# Patient Record
Sex: FEMALE | Race: WHITE | Employment: OTHER | URBAN - METROPOLITAN AREA
[De-identification: names, ages, dates, MRNs, and addresses within clinical notes are randomized per-mention and may not be internally consistent; named-entity substitution may affect disease eponyms.]

---

## 2017-04-10 ENCOUNTER — TELEPHONE (OUTPATIENT)
Dept: FAMILY MEDICINE CLINIC | Age: 82
End: 2017-04-10

## 2017-04-10 ENCOUNTER — OFFICE VISIT (OUTPATIENT)
Dept: FAMILY MEDICINE CLINIC | Age: 82
End: 2017-04-10

## 2017-04-10 VITALS
WEIGHT: 154 LBS | DIASTOLIC BLOOD PRESSURE: 54 MMHG | BODY MASS INDEX: 25.63 KG/M2 | OXYGEN SATURATION: 98 % | TEMPERATURE: 98.4 F | SYSTOLIC BLOOD PRESSURE: 126 MMHG | HEART RATE: 81 BPM

## 2017-04-10 DIAGNOSIS — J06.9 UPPER RESPIRATORY TRACT INFECTION, UNSPECIFIED TYPE: Primary | ICD-10-CM

## 2017-04-10 DIAGNOSIS — E11.9 CONTROLLED TYPE 2 DIABETES MELLITUS WITHOUT COMPLICATION, WITHOUT LONG-TERM CURRENT USE OF INSULIN (HCC): ICD-10-CM

## 2017-04-10 DIAGNOSIS — E78.2 MIXED HYPERLIPIDEMIA: Primary | ICD-10-CM

## 2017-04-10 PROCEDURE — 1090F PRES/ABSN URINE INCON ASSESS: CPT | Performed by: FAMILY MEDICINE

## 2017-04-10 PROCEDURE — 1036F TOBACCO NON-USER: CPT | Performed by: FAMILY MEDICINE

## 2017-04-10 PROCEDURE — 4040F PNEUMOC VAC/ADMIN/RCVD: CPT | Performed by: FAMILY MEDICINE

## 2017-04-10 PROCEDURE — G8400 PT W/DXA NO RESULTS DOC: HCPCS | Performed by: FAMILY MEDICINE

## 2017-04-10 PROCEDURE — 99213 OFFICE O/P EST LOW 20 MIN: CPT | Performed by: FAMILY MEDICINE

## 2017-04-10 PROCEDURE — G8420 CALC BMI NORM PARAMETERS: HCPCS | Performed by: FAMILY MEDICINE

## 2017-04-10 PROCEDURE — G8427 DOCREV CUR MEDS BY ELIG CLIN: HCPCS | Performed by: FAMILY MEDICINE

## 2017-04-10 PROCEDURE — 36415 COLL VENOUS BLD VENIPUNCTURE: CPT | Performed by: FAMILY MEDICINE

## 2017-04-10 PROCEDURE — 1123F ACP DISCUSS/DSCN MKR DOCD: CPT | Performed by: FAMILY MEDICINE

## 2017-04-10 RX ORDER — AMOXICILLIN 875 MG/1
875 TABLET, COATED ORAL 2 TIMES DAILY
Qty: 20 TABLET | Refills: 0 | Status: SHIPPED | OUTPATIENT
Start: 2017-04-10 | End: 2017-04-20

## 2017-04-10 RX ORDER — DEXTROMETHORPHAN HYDROBROMIDE AND PROMETHAZINE HYDROCHLORIDE 15; 6.25 MG/5ML; MG/5ML
5 SYRUP ORAL 4 TIMES DAILY PRN
Qty: 120 ML | Refills: 0 | Status: SHIPPED | OUTPATIENT
Start: 2017-04-10 | End: 2017-04-17

## 2017-04-10 ASSESSMENT — ENCOUNTER SYMPTOMS
SORE THROAT: 0
COUGH: 1

## 2017-04-25 ENCOUNTER — NURSE ONLY (OUTPATIENT)
Dept: FAMILY MEDICINE CLINIC | Age: 82
End: 2017-04-25

## 2017-04-25 DIAGNOSIS — E78.2 MIXED HYPERLIPIDEMIA: Primary | ICD-10-CM

## 2017-04-25 LAB
ALT SERPL-CCNC: 20 U/L (ref 10–40)
CHOLESTEROL, TOTAL: 187 MG/DL (ref 0–199)
HDLC SERPL-MCNC: 61 MG/DL (ref 40–60)
LDL CHOLESTEROL CALCULATED: 99 MG/DL
TRIGL SERPL-MCNC: 133 MG/DL (ref 0–150)
VLDLC SERPL CALC-MCNC: 27 MG/DL

## 2017-04-26 LAB
ESTIMATED AVERAGE GLUCOSE: 185.8 MG/DL
HBA1C MFR BLD: 8.1 %

## 2017-05-22 ENCOUNTER — OFFICE VISIT (OUTPATIENT)
Dept: FAMILY MEDICINE CLINIC | Age: 82
End: 2017-05-22

## 2017-05-22 VITALS
OXYGEN SATURATION: 99 % | SYSTOLIC BLOOD PRESSURE: 124 MMHG | RESPIRATION RATE: 16 BRPM | DIASTOLIC BLOOD PRESSURE: 80 MMHG | HEART RATE: 70 BPM | BODY MASS INDEX: 24.96 KG/M2 | WEIGHT: 150 LBS

## 2017-05-22 DIAGNOSIS — I10 BENIGN ESSENTIAL HTN: ICD-10-CM

## 2017-05-22 DIAGNOSIS — F51.01 PRIMARY INSOMNIA: ICD-10-CM

## 2017-05-22 DIAGNOSIS — E78.2 MIXED HYPERLIPIDEMIA: ICD-10-CM

## 2017-05-22 PROCEDURE — G8427 DOCREV CUR MEDS BY ELIG CLIN: HCPCS | Performed by: FAMILY MEDICINE

## 2017-05-22 PROCEDURE — G8400 PT W/DXA NO RESULTS DOC: HCPCS | Performed by: FAMILY MEDICINE

## 2017-05-22 PROCEDURE — 99214 OFFICE O/P EST MOD 30 MIN: CPT | Performed by: FAMILY MEDICINE

## 2017-05-22 PROCEDURE — 4040F PNEUMOC VAC/ADMIN/RCVD: CPT | Performed by: FAMILY MEDICINE

## 2017-05-22 PROCEDURE — G8420 CALC BMI NORM PARAMETERS: HCPCS | Performed by: FAMILY MEDICINE

## 2017-05-22 PROCEDURE — 1123F ACP DISCUSS/DSCN MKR DOCD: CPT | Performed by: FAMILY MEDICINE

## 2017-05-22 PROCEDURE — 1090F PRES/ABSN URINE INCON ASSESS: CPT | Performed by: FAMILY MEDICINE

## 2017-05-22 PROCEDURE — 1036F TOBACCO NON-USER: CPT | Performed by: FAMILY MEDICINE

## 2017-05-22 RX ORDER — SIMVASTATIN 20 MG
20 TABLET ORAL NIGHTLY
Qty: 90 TABLET | Refills: 1 | Status: SHIPPED | OUTPATIENT
Start: 2017-05-22 | End: 2017-11-11 | Stop reason: SDUPTHER

## 2017-05-22 RX ORDER — ZOLPIDEM TARTRATE 5 MG/1
5 TABLET ORAL NIGHTLY PRN
COMMUNITY
End: 2017-05-22 | Stop reason: SDUPTHER

## 2017-05-22 RX ORDER — LISINOPRIL 2.5 MG/1
2.5 TABLET ORAL DAILY
Qty: 90 TABLET | Refills: 1 | Status: SHIPPED | OUTPATIENT
Start: 2017-05-22 | End: 2017-12-30 | Stop reason: SDUPTHER

## 2017-05-22 RX ORDER — PIOGLITAZONEHYDROCHLORIDE 15 MG/1
15 TABLET ORAL DAILY
Qty: 90 TABLET | Refills: 1 | Status: CANCELLED | OUTPATIENT
Start: 2017-05-22

## 2017-05-22 RX ORDER — ZOLPIDEM TARTRATE 5 MG/1
5 TABLET ORAL NIGHTLY PRN
Qty: 30 TABLET | Refills: 0 | Status: SHIPPED | OUTPATIENT
Start: 2017-05-22 | End: 2019-07-22 | Stop reason: SDUPTHER

## 2017-05-23 ENCOUNTER — TELEPHONE (OUTPATIENT)
Dept: FAMILY MEDICINE CLINIC | Age: 82
End: 2017-05-23

## 2017-05-31 ENCOUNTER — TELEPHONE (OUTPATIENT)
Dept: FAMILY MEDICINE CLINIC | Age: 82
End: 2017-05-31

## 2017-06-14 ENCOUNTER — TELEPHONE (OUTPATIENT)
Dept: FAMILY MEDICINE CLINIC | Age: 82
End: 2017-06-14

## 2017-09-01 ENCOUNTER — TELEPHONE (OUTPATIENT)
Dept: FAMILY MEDICINE CLINIC | Age: 82
End: 2017-09-01

## 2017-09-01 PROCEDURE — 36415 COLL VENOUS BLD VENIPUNCTURE: CPT | Performed by: FAMILY MEDICINE

## 2017-09-05 ENCOUNTER — NURSE ONLY (OUTPATIENT)
Dept: FAMILY MEDICINE CLINIC | Age: 82
End: 2017-09-05

## 2017-09-05 LAB
A/G RATIO: 1.6 (ref 1.1–2.2)
ALBUMIN SERPL-MCNC: 3.9 G/DL (ref 3.4–5)
ALP BLD-CCNC: 55 U/L (ref 40–129)
ALT SERPL-CCNC: 18 U/L (ref 10–40)
ANION GAP SERPL CALCULATED.3IONS-SCNC: 14 MMOL/L (ref 3–16)
AST SERPL-CCNC: 18 U/L (ref 15–37)
BILIRUB SERPL-MCNC: 0.4 MG/DL (ref 0–1)
BUN BLDV-MCNC: 15 MG/DL (ref 7–20)
CALCIUM SERPL-MCNC: 9.1 MG/DL (ref 8.3–10.6)
CHLORIDE BLD-SCNC: 99 MMOL/L (ref 99–110)
CO2: 27 MMOL/L (ref 21–32)
CREAT SERPL-MCNC: 0.8 MG/DL (ref 0.6–1.2)
GFR AFRICAN AMERICAN: >60
GFR NON-AFRICAN AMERICAN: >60
GLOBULIN: 2.5 G/DL
GLUCOSE BLD-MCNC: 140 MG/DL (ref 70–99)
POTASSIUM SERPL-SCNC: 4.9 MMOL/L (ref 3.5–5.1)
SODIUM BLD-SCNC: 140 MMOL/L (ref 136–145)
TOTAL PROTEIN: 6.4 G/DL (ref 6.4–8.2)

## 2017-09-05 PROCEDURE — 36415 COLL VENOUS BLD VENIPUNCTURE: CPT | Performed by: FAMILY MEDICINE

## 2017-09-06 ENCOUNTER — OFFICE VISIT (OUTPATIENT)
Dept: FAMILY MEDICINE CLINIC | Age: 82
End: 2017-09-06

## 2017-09-06 VITALS
HEART RATE: 70 BPM | DIASTOLIC BLOOD PRESSURE: 52 MMHG | OXYGEN SATURATION: 98 % | BODY MASS INDEX: 24.96 KG/M2 | SYSTOLIC BLOOD PRESSURE: 122 MMHG | WEIGHT: 150 LBS

## 2017-09-06 DIAGNOSIS — E11.9 CONTROLLED TYPE 2 DIABETES MELLITUS WITHOUT COMPLICATION, WITHOUT LONG-TERM CURRENT USE OF INSULIN (HCC): Primary | ICD-10-CM

## 2017-09-06 DIAGNOSIS — Z23 NEEDS FLU SHOT: ICD-10-CM

## 2017-09-06 DIAGNOSIS — F41.9 ANXIETY: ICD-10-CM

## 2017-09-06 LAB
ESTIMATED AVERAGE GLUCOSE: 162.8 MG/DL
HBA1C MFR BLD: 7.3 %

## 2017-09-06 PROCEDURE — G8427 DOCREV CUR MEDS BY ELIG CLIN: HCPCS | Performed by: FAMILY MEDICINE

## 2017-09-06 PROCEDURE — G8400 PT W/DXA NO RESULTS DOC: HCPCS | Performed by: FAMILY MEDICINE

## 2017-09-06 PROCEDURE — 1123F ACP DISCUSS/DSCN MKR DOCD: CPT | Performed by: FAMILY MEDICINE

## 2017-09-06 PROCEDURE — 1090F PRES/ABSN URINE INCON ASSESS: CPT | Performed by: FAMILY MEDICINE

## 2017-09-06 PROCEDURE — G0008 ADMIN INFLUENZA VIRUS VAC: HCPCS | Performed by: FAMILY MEDICINE

## 2017-09-06 PROCEDURE — 1036F TOBACCO NON-USER: CPT | Performed by: FAMILY MEDICINE

## 2017-09-06 PROCEDURE — 90662 IIV NO PRSV INCREASED AG IM: CPT | Performed by: FAMILY MEDICINE

## 2017-09-06 PROCEDURE — 4040F PNEUMOC VAC/ADMIN/RCVD: CPT | Performed by: FAMILY MEDICINE

## 2017-09-06 PROCEDURE — G8420 CALC BMI NORM PARAMETERS: HCPCS | Performed by: FAMILY MEDICINE

## 2017-09-06 PROCEDURE — 99213 OFFICE O/P EST LOW 20 MIN: CPT | Performed by: FAMILY MEDICINE

## 2017-09-06 RX ORDER — ALPRAZOLAM 0.25 MG/1
0.25 TABLET ORAL DAILY PRN
Qty: 30 TABLET | Refills: 0 | Status: SHIPPED | OUTPATIENT
Start: 2017-09-06 | End: 2019-07-22 | Stop reason: SDUPTHER

## 2017-09-07 ENCOUNTER — TELEPHONE (OUTPATIENT)
Dept: FAMILY MEDICINE CLINIC | Age: 82
End: 2017-09-07

## 2017-09-07 DIAGNOSIS — E11.9 CONTROLLED TYPE 2 DIABETES MELLITUS WITHOUT COMPLICATION, WITHOUT LONG-TERM CURRENT USE OF INSULIN (HCC): Primary | ICD-10-CM

## 2017-09-10 PROBLEM — E11.9 CONTROLLED TYPE 2 DIABETES MELLITUS WITHOUT COMPLICATION, WITHOUT LONG-TERM CURRENT USE OF INSULIN (HCC): Status: ACTIVE | Noted: 2017-09-10

## 2017-09-22 ENCOUNTER — OFFICE VISIT (OUTPATIENT)
Dept: FAMILY MEDICINE CLINIC | Age: 82
End: 2017-09-22

## 2017-09-22 VITALS
HEART RATE: 65 BPM | SYSTOLIC BLOOD PRESSURE: 126 MMHG | OXYGEN SATURATION: 97 % | WEIGHT: 149 LBS | BODY MASS INDEX: 24.79 KG/M2 | DIASTOLIC BLOOD PRESSURE: 72 MMHG

## 2017-09-22 DIAGNOSIS — R31.9 BLOOD IN URINE: ICD-10-CM

## 2017-09-22 DIAGNOSIS — R30.9 PAINFUL URINATION: Primary | ICD-10-CM

## 2017-09-22 LAB
BILIRUBIN, POC: NORMAL
BLOOD URINE, POC: NORMAL
CLARITY, POC: NORMAL
COLOR, POC: NORMAL
GLUCOSE URINE, POC: NORMAL
KETONES, POC: NORMAL
LEUKOCYTE EST, POC: NORMAL
NITRITE, POC: NORMAL
PH, POC: 7
PROTEIN, POC: 30
SPECIFIC GRAVITY, POC: 1.01
UROBILINOGEN, POC: 0.2

## 2017-09-22 PROCEDURE — 1123F ACP DISCUSS/DSCN MKR DOCD: CPT | Performed by: FAMILY MEDICINE

## 2017-09-22 PROCEDURE — G8420 CALC BMI NORM PARAMETERS: HCPCS | Performed by: FAMILY MEDICINE

## 2017-09-22 PROCEDURE — 4040F PNEUMOC VAC/ADMIN/RCVD: CPT | Performed by: FAMILY MEDICINE

## 2017-09-22 PROCEDURE — G8427 DOCREV CUR MEDS BY ELIG CLIN: HCPCS | Performed by: FAMILY MEDICINE

## 2017-09-22 PROCEDURE — 81002 URINALYSIS NONAUTO W/O SCOPE: CPT | Performed by: FAMILY MEDICINE

## 2017-09-22 PROCEDURE — G8400 PT W/DXA NO RESULTS DOC: HCPCS | Performed by: FAMILY MEDICINE

## 2017-09-22 PROCEDURE — 99214 OFFICE O/P EST MOD 30 MIN: CPT | Performed by: FAMILY MEDICINE

## 2017-09-22 PROCEDURE — 1036F TOBACCO NON-USER: CPT | Performed by: FAMILY MEDICINE

## 2017-09-22 PROCEDURE — 1090F PRES/ABSN URINE INCON ASSESS: CPT | Performed by: FAMILY MEDICINE

## 2017-09-22 RX ORDER — SULFAMETHOXAZOLE AND TRIMETHOPRIM 800; 160 MG/1; MG/1
1 TABLET ORAL 2 TIMES DAILY
Qty: 20 TABLET | Refills: 0 | Status: SHIPPED | OUTPATIENT
Start: 2017-09-22 | End: 2017-10-02

## 2017-09-24 LAB — URINE CULTURE, ROUTINE: NORMAL

## 2017-09-26 ASSESSMENT — ENCOUNTER SYMPTOMS
NAUSEA: 0
VOMITING: 0

## 2017-11-27 ENCOUNTER — TELEPHONE (OUTPATIENT)
Dept: FAMILY MEDICINE CLINIC | Age: 82
End: 2017-11-27

## 2017-11-27 DIAGNOSIS — E11.9 CONTROLLED TYPE 2 DIABETES MELLITUS WITHOUT COMPLICATION, WITHOUT LONG-TERM CURRENT USE OF INSULIN (HCC): ICD-10-CM

## 2017-11-27 PROCEDURE — 36415 COLL VENOUS BLD VENIPUNCTURE: CPT | Performed by: FAMILY MEDICINE

## 2017-11-27 NOTE — TELEPHONE ENCOUNTER
Pls tell her that her chol has been well controlled with simvastatin, and it was great in 4/17, so no need to check it for 1 yr.

## 2017-11-28 LAB
ESTIMATED AVERAGE GLUCOSE: 171.4 MG/DL
HBA1C MFR BLD: 7.6 %

## 2017-11-30 ENCOUNTER — OFFICE VISIT (OUTPATIENT)
Dept: FAMILY MEDICINE CLINIC | Age: 82
End: 2017-11-30

## 2017-11-30 VITALS
BODY MASS INDEX: 25.09 KG/M2 | DIASTOLIC BLOOD PRESSURE: 60 MMHG | RESPIRATION RATE: 16 BRPM | SYSTOLIC BLOOD PRESSURE: 106 MMHG | HEART RATE: 71 BPM | WEIGHT: 150.6 LBS | TEMPERATURE: 97.8 F | HEIGHT: 65 IN | OXYGEN SATURATION: 98 %

## 2017-11-30 PROCEDURE — 1036F TOBACCO NON-USER: CPT | Performed by: FAMILY MEDICINE

## 2017-11-30 PROCEDURE — G8400 PT W/DXA NO RESULTS DOC: HCPCS | Performed by: FAMILY MEDICINE

## 2017-11-30 PROCEDURE — G8419 CALC BMI OUT NRM PARAM NOF/U: HCPCS | Performed by: FAMILY MEDICINE

## 2017-11-30 PROCEDURE — G8484 FLU IMMUNIZE NO ADMIN: HCPCS | Performed by: FAMILY MEDICINE

## 2017-11-30 PROCEDURE — G8427 DOCREV CUR MEDS BY ELIG CLIN: HCPCS | Performed by: FAMILY MEDICINE

## 2017-11-30 PROCEDURE — 99213 OFFICE O/P EST LOW 20 MIN: CPT | Performed by: FAMILY MEDICINE

## 2017-11-30 PROCEDURE — 1090F PRES/ABSN URINE INCON ASSESS: CPT | Performed by: FAMILY MEDICINE

## 2017-11-30 PROCEDURE — 4040F PNEUMOC VAC/ADMIN/RCVD: CPT | Performed by: FAMILY MEDICINE

## 2017-11-30 PROCEDURE — 1123F ACP DISCUSS/DSCN MKR DOCD: CPT | Performed by: FAMILY MEDICINE

## 2017-11-30 ASSESSMENT — PATIENT HEALTH QUESTIONNAIRE - PHQ9
SUM OF ALL RESPONSES TO PHQ9 QUESTIONS 1 & 2: 0
SUM OF ALL RESPONSES TO PHQ QUESTIONS 1-9: 0
1. LITTLE INTEREST OR PLEASURE IN DOING THINGS: 0
2. FEELING DOWN, DEPRESSED OR HOPELESS: 0

## 2017-11-30 ASSESSMENT — ENCOUNTER SYMPTOMS: RESPIRATORY NEGATIVE: 1

## 2017-11-30 NOTE — PATIENT INSTRUCTIONS
Check with your [facility] doctor to see if you had Prevnar 13 and Tdap. Please let us know when you had it if so.

## 2018-05-09 RX ORDER — SITAGLIPTIN 100 MG/1
100 TABLET, FILM COATED ORAL DAILY
Qty: 90 TABLET | Refills: 3 | Status: SHIPPED | OUTPATIENT
Start: 2018-05-09 | End: 2018-08-25

## 2018-06-20 ENCOUNTER — HOSPITAL ENCOUNTER (OUTPATIENT)
Dept: PHYSICAL THERAPY | Age: 83
Discharge: HOME | End: 2018-06-30

## 2018-06-20 NOTE — FLOWSHEET NOTE
Physical Therapy Daily Treatment Note    Date:  2018    Patient Name:  Mikael Castaneda    :  1932  MRN: [id number]  Restrictions/Precautions:  universal  Medical/Treatment Diagnosis Information:  · Diagnosis: OA knee  Insurance/Certification information:  PT Insurance Information: Medicare  Physician Information:  Referring Practitioner: Hai Gagnon MD  Plan of care signed (Y/N):  N  Visit# / total visits:  1/mn     G-Code (if applicable):         PT G-Codes  Functional Assessment Tool Used: LEFS  Score: 48  Functional Limitation: Mobility: Walking and moving around  Mobility: Walking and Moving Around Current Status (): At least 20 percent but less than 40 percent impaired, limited or restricted  Mobility: Walking and Moving Around Goal Status (): At least 1 percent but less than 20 percent impaired, limited or restricted    Medicare Cap (if applicable):  $402= total amount used, updated 2018    Time in:   1:00pm     Timed Treatment: 25min Total Treatment Time:  70min.  ________________________________________________________________________________________    Pain Level:    /10  SUBJECTIVE: see eval    OBJECTIVE:     Exercise/Equipment Resistance/Repetitions Other comments                        Bridge   x10    SLR w/ quad set x10    Quad set x10    Heel slide x10    Mini-squat x10    HS curls x10                                                                               Other Therapeutic Activities:      Manual Treatments:         Modalities:      Test/Measurements:         ASSESSMENT:     See eval    Treatment/Activity Tolerance:   [x] Patient tolerated treatment well [] Patient limited by fatique  [] Patient limited by pain [] Patient limited by other medical complications  [] Other:     Goals:    Short term goals  Time Frame for Short term goals: 4 weeks  Short term goal 1: patient to be independent with HEP to self manage symptoms.   Short term goal 2: patient to improve (R)

## 2018-06-20 NOTE — FLOWSHEET NOTE
Physical Therapy Aquatic Flow Sheet  Date:  6/20/2018    Patient Name:  Mikael Castaneda    Restrictions: universal   Diagnosis:  OA knee, decreased balance  Treatment Diagnosis:  Weakness, decreased balance   Insurance/Certification information:  Medicare  Plan of care signed (Y/N):  N  Visit# / total visits:  1/mn  Pain level: /10   Electronically signed by:  Guicho Mcnally PT    Medicare Cap (if applicable):  $310 = total amount used, updated 6/20/2018    Key  B= Belt DB= Dumbells T= Theratube   H= Hydrotone N= Noodles W= Weights   P= Paddles S= Speedo equipment K= Kickboard     Exercises/Activities   Warm-up/Amb    Exercises      Slow forward   nv  HR/TR  nv    Slow sideways  nv  Marches  nv    Slow backwards  nv  Mini-squats  nv    Medium forward    4-way SLR  nv    Medium sideways    Hip circles/fig 8  nv    Small shuffle    Hamstring curls  nv    Jog    Knee extension      Braiding    Pelvic tilts      Bicycling  nv  Scap squeezes          Shoulder flex/ext      Functional    Shoulder abd/add      Step  nv  Shoulder H. abd/add      Lifting    Shoulder IR/ER      Hand to opp knee    Rowing      Push down squat    Bilateral pull down      UE PNF    Push/pull      LE PNF    Push downs      Wall push ups    Arm circles      SLS    Elbow flex/ext          Chin tuck      Stretching    UT shrugs/rolls      Gastroc/Soleus  nv  Rocking horse      Hamstring  nv        SKTC    Other      Piriformis          Hip flexor          Ladder pull          Pec stretch          Post deltoid           Time In:      Timed Code Treatment Minutes:       Total Treatment Minutes:      Treatment/Activity Tolerance:   [x] Patient tolerated treatment well [] Patient limited by fatigue   [] Patient limited by pain [] Patient limited by other medical complications  [] Other:     Prognosis: [x] Good [] Fair  [] Poor    Patient Requires Follow-up:  [x] Yes  [] No    Plan: [x] Continue per plan of care [] Alter current plan (see

## 2018-06-20 NOTE — PROGRESS NOTES
Physical Therapy  Initial Assessment  Date: 2018  Patient Name: Tashi Lainez  MRN: [id number]  : 1932     Treatment Diagnosis: weakness, decreased balance    Restrictions  Restrictions/Precautions  Restrictions/Precautions: General Precautions    Subjective   General  Chart Reviewed: Yes  Patient assessed for rehabilitation services?: Yes  Family / Caregiver Present: No  Referring Practitioner: Leticia Vaughn MD  Referral Date : 05/15/18  Diagnosis: OA knee  Follows Commands: Within Functional Limits  PT Visit Information  Onset Date: 18  PT Insurance Information: Medicare  Subjective  Subjective: Patient reports she had a meniscus tear in the (R) knee. Had a repair on 2018. The patient did not have any physical therapy following surgery. Reports \"I noticed that my balance isn't that great, I play golf and am pretty active\". Reports after the operation she was on a walker then progressed to a cane and now isn't using any ADs. Reports \"I just don't walk straight now because I am afraid I am going to fall\". Reports she feels weaker, tightness in the knee and decreased balance.   Pain Screening  Patient Currently in Pain: Denies  Vital Signs  Patient Currently in Pain: Denies    Vision/Hearing  Vision  Vision: Impaired (reading)  Hearing  Hearing: Within functional limits    Orientation  Orientation  Overall Orientation Status: Within Functional Limits    Social/Functional History  Social/Functional History  Occupation: Retired  Type of occupation: Realtor  Leisure & Hobbies: golf, swimming, boating  Objective     Observation/Palpation  Observation: GAIT: slow elias, decreased trunk sway, decreased stride length, decreased pushoff (B)    PROM RLE (degrees)  RLE General PROM: Knee flexion: -6* from 0* to 125*  AROM RLE (degrees)  RLE General AROM: Knee flexion: -9* from 0* to 115*  AROM LLE (degrees)  LLE General AROM: Knee flexion: 0-125*    Strength RLE  Strength RLE: WFL  R Hip Strengthening, Aquatics, Safety Education & Training, Home Exercise Program, Neuromuscular Re-education, Manual Therapy - Soft Tissue Mobilization, Balance Training, Functional Mobility Training, Gait Training, Transfer Training, Stair training    G-Code  PT G-Codes  Functional Assessment Tool Used: LEFS  Score: 48  Functional Limitation: Mobility: Walking and moving around  Mobility: Walking and Moving Around Current Status (): At least 20 percent but less than 40 percent impaired, limited or restricted  Mobility: Walking and Moving Around Goal Status (): At least 1 percent but less than 20 percent impaired, limited or restricted      Goals  Short term goals  Time Frame for Short term goals: 4 weeks  Short term goal 1: patient to be independent with HEP to self manage symptoms. Short term goal 2: patient to improve (R) LE strength to at least 4/5 grossly to improve gait. Short term goal 3: patient to improve (R) LE ROM to equal (L) to improve gait mechanics. Short term goal 4: patient to improve LEFS by at least 10 points demonstrating improved participation in everyday activity. Long term goals  Time Frame for Long term goals : 8 weeks  Long term goal 1: patient to improve (B) LE strength to at least 4+/5 grossly to improve gait. Long term goal 2: patient to improve LEFS by at least 15 points demonstrating improved participation in everyday activity. Long term goal 3: patient to demonstrate a TUG score of less than 15sec demonstrating improved gait elias and stability. Patient Goals   Patient goals : \"I am active, I want to be active and fix my balance\"       Therapy Time   Individual Concurrent Group Co-treatment   Time In 1300         Time Out 1410         Minutes 70         Timed Code Treatment Minutes: Joy Taylor 4, PT, DPT    If you have any questions or concerns, please don't hesitate to call.   Thank you for your referral.    Physician

## 2018-07-01 ENCOUNTER — HOSPITAL ENCOUNTER (OUTPATIENT)
Dept: OTHER | Age: 83
Discharge: HOME | End: 2018-07-01

## 2018-07-18 ENCOUNTER — TELEPHONE (OUTPATIENT)
Dept: FAMILY MEDICINE CLINIC | Age: 83
End: 2018-07-18

## 2018-07-19 ENCOUNTER — HOSPITAL ENCOUNTER (OUTPATIENT)
Dept: PHYSICAL THERAPY | Age: 83
Setting detail: THERAPIES SERIES
Discharge: HOME | End: 2018-07-19
Payer: MEDICARE

## 2018-07-19 DIAGNOSIS — E11.9 TYPE 2 DIABETES MELLITUS WITHOUT COMPLICATION, WITHOUT LONG-TERM CURRENT USE OF INSULIN (HCC): Primary | ICD-10-CM

## 2018-07-19 DIAGNOSIS — E78.2 MIXED HYPERLIPIDEMIA: ICD-10-CM

## 2018-07-19 PROCEDURE — 97150 GROUP THERAPEUTIC PROCEDURES: CPT

## 2018-07-19 PROCEDURE — 97113 AQUATIC THERAPY/EXERCISES: CPT

## 2018-07-19 NOTE — FLOWSHEET NOTE
Physical Therapy Aquatic Flow Sheet  Date:  7/19/2018    Patient Name:  Neto Wong    Restrictions: universal   Diagnosis:  OA knee, decreased balance  Treatment Diagnosis:  Weakness, decreased balance   Insurance/Certification information:  Medicare  Plan of care signed (Y/N):  N  Visit# / total visits:  9/mn  Pain level: 1/10   Electronically signed by:  Boris Washington PT    Medicare Cap (if applicable):  $522 = total amount used, updated 7/19/2018    Key  B= Belt DB= Dumbells T= Theratube   H= Hydrotone N= Noodles W= Weights   P= Paddles S= Speedo equipment K= Kickboard     Exercises/Activities   Warm-up/Amb    Exercises      Slow forward  2 laps  HR/TR  x20B    Slow sideways  2 laps  Marches  x2min    Slow backwards  2 laps  Mini-squats  x20    Medium forward    4-way SLR  x15B    Medium sideways    Hip circles/fig 8  x20B    Small shuffle    Hamstring curls  x15B    Jog    Knee extension      Braiding    Pelvic tilts  [id number] hold    Bicycling  x2min  Scap squeezes          Shoulder flex/ext      Functional    Shoulder abd/add      Step  x15B  Shoulder H. abd/add      Lifting    Shoulder IR/ER      Hand to opp knee    Rowing      Push down squat    Bilateral pull down      UE PNF    Push/pull      LE PNF    Push downs      Wall push ups    Arm circles      SLS  [id number] B  Elbow flex/ext          Chin tuck      Stretching    UT shrugs/rolls      Gastroc/Soleus [id number]  Rocking horse      Hamstring [id number]        SKTC    Other      Piriformis          Hip flexor          Ladder pull          Pec stretch          Post deltoid           Time In:  2:20pm    Timed Code Treatment Minutes:  15min. Total Treatment Minutes:  48min.     Treatment/Activity Tolerance:   [x] Patient tolerated treatment well [] Patient limited by fatigue   [] Patient limited by pain [] Patient limited by other medical complications  [] Other:     Prognosis: [x] Good [] Fair  [] Poor    Patient Requires Follow-up:  [x] Yes  []

## 2018-07-23 ENCOUNTER — HOSPITAL ENCOUNTER (OUTPATIENT)
Dept: PHYSICAL THERAPY | Age: 83
Setting detail: THERAPIES SERIES
Discharge: HOME | End: 2018-07-23
Payer: MEDICARE

## 2018-07-23 PROCEDURE — 97110 THERAPEUTIC EXERCISES: CPT

## 2018-07-23 PROCEDURE — G8979 MOBILITY GOAL STATUS: HCPCS

## 2018-07-23 PROCEDURE — G8978 MOBILITY CURRENT STATUS: HCPCS

## 2018-07-24 ENCOUNTER — TELEPHONE (OUTPATIENT)
Dept: FAMILY MEDICINE CLINIC | Age: 83
End: 2018-07-24

## 2018-07-24 NOTE — TELEPHONE ENCOUNTER
Sara Terrazas is calling to speak with [place] only regarding a work in appointment due to the right knee pain. Tried [employer], no answer.     Sara Terrazas [phone]

## 2018-07-25 ENCOUNTER — HOSPITAL ENCOUNTER (OUTPATIENT)
Dept: PHYSICAL THERAPY | Age: 83
Setting detail: THERAPIES SERIES
Discharge: HOME | End: 2018-07-25
Payer: MEDICARE

## 2018-07-25 ENCOUNTER — TELEPHONE (OUTPATIENT)
Dept: FAMILY MEDICINE CLINIC | Age: 83
End: 2018-07-25

## 2018-07-25 DIAGNOSIS — M25.569 KNEE PAIN, UNSPECIFIED CHRONICITY, UNSPECIFIED LATERALITY: Primary | ICD-10-CM

## 2018-07-25 PROCEDURE — 97110 THERAPEUTIC EXERCISES: CPT

## 2018-07-25 PROCEDURE — 97112 NEUROMUSCULAR REEDUCATION: CPT

## 2018-07-30 ENCOUNTER — HOSPITAL ENCOUNTER (OUTPATIENT)
Dept: PHYSICAL THERAPY | Age: 83
Setting detail: THERAPIES SERIES
Discharge: HOME | End: 2018-07-30
Payer: MEDICARE

## 2018-07-30 PROCEDURE — 97112 NEUROMUSCULAR REEDUCATION: CPT

## 2018-07-30 PROCEDURE — 97116 GAIT TRAINING THERAPY: CPT

## 2018-07-30 NOTE — FLOWSHEET NOTE
motivation. No pain reported at end of PT session. Treatment/Activity Tolerance:   [x] Patient tolerated treatment well [] Patient limited by fatique  [] Patient limited by pain [] Patient limited by other medical complications  [] Other:     Goals:    Short term goals  Time Frame for Short term goals: 4 weeks  Short term goal 1: patient to be independent with HEP to self manage symptoms. (Partly met)  Short term goal 2: patient to improve (R) LE strength to at least 4/5 grossly to improve gait. (MET)  Short term goal 3: patient to improve (R) LE ROM to equal (L) to improve gait mechanics. (NOT MET)  Short term goal 4: patient to improve LEFS by at least 10 points demonstrating improved participation in everyday activity. (partly met)     Long term goals  Time Frame for Long term goals : 8 weeks  Long term goal 1: patient to improve (B) LE strength to at least 4+/5 grossly to improve gait. Long term goal 2: patient to improve LEFS by at least 15 points demonstrating improved participation in everyday activity. Long term goal 3: patient to demonstrate a TUG score of less than 15sec demonstrating improved gait elias and stability. Plan: [x] Continue per plan of care [] Alter current plan (see comments)   [] Plan of care initiated [] Hold pending MD visit [] Discharge      Plan for Next Session: Biomechanical correction of problems as they present. Facilitate correct muscle firing patterns and activation with appropriate activities. Progress patient as tolerated.        Re-Certification Due Date:  8/23/18    Signature:  Robert Tse, PT, DPT

## 2018-08-01 ENCOUNTER — HOSPITAL ENCOUNTER (OUTPATIENT)
Dept: PHYSICAL THERAPY | Age: 83
Setting detail: THERAPIES SERIES
Discharge: HOME | End: 2018-08-01
Payer: MEDICARE

## 2018-08-01 PROCEDURE — 97112 NEUROMUSCULAR REEDUCATION: CPT

## 2018-08-01 PROCEDURE — 97116 GAIT TRAINING THERAPY: CPT

## 2018-08-01 NOTE — FLOWSHEET NOTE
Physical Therapy Daily Treatment Note    Date:  2018    Patient Name:  Husam Theodore    :  1932  MRN: [id number]  Restrictions/Precautions:  universal  Medical/Treatment Diagnosis Information:  · Diagnosis: OA knee  Insurance/Certification information:  PT Insurance Information: Medicare  Physician Information:  Referring Practitioner: Roberta Blake MD  Plan of care signed (Y/N):  N  Visit# / total visits:  12/mn     G-Code (if applicable):         PT G-Codes  Functional Assessment Tool Used: LEFS  Score: 57 (was 48)  Functional Limitation: Mobility: Walking and moving around  Mobility: Walking and Moving Around Current Status (): At least 20 percent but less than 40 percent impaired, limited or restricted  Mobility: Walking and Moving Around Goal Status (): At least 1 percent but less than 20 percent impaired, limited or restricted    Medicare Cap (if applicable):  $368= total amount used, updated 2018    Time in:   11:30am     Timed Treatment: 30min Total Treatment Time:  30min.  ________________________________________________________________________________________    Pain Level:    0/10  SUBJECTIVE: Patient reports \"I am doing good, just want to keep learning to walk\". Reports compliance with HEP. OBJECTIVE:     Exercise/Equipment Resistance/Repetitions Other comments    NuStep warm-up  Level 5 x5min                  Bridge   HEP   SLR w/ quad set HEP   Quad set HEP   Heel slide HEP             Mini-squat    HS curls    Hip 3 way       Step up 6 \" step     Rockerboard Tapping x10 f/b,s/s  Balance x30sec f/b,s/s   Balance Blue balance foam EC [id number] ea DLS  [place] EO 9L09 sec ea  EC x30sec (B) SLS  Mini-squat x10             Gait Training   Uphill/downhill x12min                  Recumbent bike    For ROM                Test/Measurements:  --       ASSESSMENT:  The patient performed above balance activity well with minimal cues.   The patient performed above gait training

## 2018-08-06 ENCOUNTER — HOSPITAL ENCOUNTER (OUTPATIENT)
Dept: PHYSICAL THERAPY | Age: 83
Setting detail: THERAPIES SERIES
Discharge: HOME | End: 2018-08-06
Payer: MEDICARE

## 2018-08-06 PROCEDURE — 97116 GAIT TRAINING THERAPY: CPT

## 2018-08-06 PROCEDURE — 97112 NEUROMUSCULAR REEDUCATION: CPT

## 2018-08-06 NOTE — FLOWSHEET NOTE
Physical Therapy Daily Treatment Note    Date:  2018    Patient Name:  Laurie Jones    :  1932  MRN: [id number]  Restrictions/Precautions:  universal  Medical/Treatment Diagnosis Information:  · Diagnosis: OA knee  Insurance/Certification information:  PT Insurance Information: Medicare  Physician Information:  Referring Practitioner: Jessie Mars MD  Plan of care signed (Y/N):  N  Visit# / total visits:  13/mn     G-Code (if applicable):         PT G-Codes  Functional Assessment Tool Used: LEFS  Score: 57 (was 48)  Functional Limitation: Mobility: Walking and moving around  Mobility: Walking and Moving Around Current Status (): At least 20 percent but less than 40 percent impaired, limited or restricted  Mobility: Walking and Moving Around Goal Status (): At least 1 percent but less than 20 percent impaired, limited or restricted    Medicare Cap (if applicable):  $800= total amount used, updated 2018    Time in:   11:30am     Timed Treatment: 30min Total Treatment Time:  30min.  ________________________________________________________________________________________    Pain Level:    0/10  SUBJECTIVE: Patient reports \"I am doing good, I see the knee doctor on the 16th of this month, oh I hope he is good\". Reports compliance with HEP.         OBJECTIVE:     Exercise/Equipment Resistance/Repetitions Other comments    NuStep warm-up  Level 5 x5min                  Bridge   HEP   SLR w/ quad set HEP   Quad set HEP   Heel slide HEP             Mini-squat    HS curls    Hip 3 way       Step up 6 \" step     Rockerboard Tapping x10 f/b,s/s  Balance x30sec f/b,s/s   Balance Blue balance foam EC 2p63ogy ea DLS  [place] EO 6F55 sec ea  EC x30sec (B) SLS  Mini-squat x10             Gait Training   Uphill/downhill, stairs x15min                  Recumbent bike    For ROM                Test/Measurements:  --       ASSESSMENT:  The patient performed above balance activity well with SBA/CGA and required minimal cues. The patient responded well to gait training with improved mechanics and speed. The patient continues to demonstrate good motivation. No pain reported at end of PT session. Treatment/Activity Tolerance:   [x] Patient tolerated treatment well [] Patient limited by fatique  [] Patient limited by pain [] Patient limited by other medical complications  [] Other:     Goals:    Short term goals  Time Frame for Short term goals: 4 weeks  Short term goal 1: patient to be independent with HEP to self manage symptoms. (Partly met)  Short term goal 2: patient to improve (R) LE strength to at least 4/5 grossly to improve gait. (MET)  Short term goal 3: patient to improve (R) LE ROM to equal (L) to improve gait mechanics. (NOT MET)  Short term goal 4: patient to improve LEFS by at least 10 points demonstrating improved participation in everyday activity. (partly met)     Long term goals  Time Frame for Long term goals : 8 weeks  Long term goal 1: patient to improve (B) LE strength to at least 4+/5 grossly to improve gait. Long term goal 2: patient to improve LEFS by at least 15 points demonstrating improved participation in everyday activity. Long term goal 3: patient to demonstrate a TUG score of less than 15sec demonstrating improved gait elias and stability. Plan: [x] Continue per plan of care [] Alter current plan (see comments)   [] Plan of care initiated [] Hold pending MD visit [] Discharge      Plan for Next Session: Biomechanical correction of problems as they present. Facilitate correct muscle firing patterns and activation with appropriate activities. Progress patient as tolerated.        Re-Certification Due Date:  8/23/18    Signature:  Robert Tse, PT, DPT

## 2018-08-09 ENCOUNTER — OFFICE VISIT (OUTPATIENT)
Dept: FAMILY MEDICINE CLINIC | Age: 83
End: 2018-08-09

## 2018-08-09 VITALS
OXYGEN SATURATION: 98 % | WEIGHT: 142 LBS | SYSTOLIC BLOOD PRESSURE: 124 MMHG | BODY MASS INDEX: 23.63 KG/M2 | DIASTOLIC BLOOD PRESSURE: 80 MMHG | HEART RATE: 74 BPM

## 2018-08-09 DIAGNOSIS — E11.9 CONTROLLED TYPE 2 DIABETES MELLITUS WITHOUT COMPLICATION, WITHOUT LONG-TERM CURRENT USE OF INSULIN (HCC): Primary | ICD-10-CM

## 2018-08-09 DIAGNOSIS — G89.29 CHRONIC PAIN OF RIGHT KNEE: ICD-10-CM

## 2018-08-09 DIAGNOSIS — Z23 NEED FOR SHINGLES VACCINE: ICD-10-CM

## 2018-08-09 DIAGNOSIS — M25.561 CHRONIC PAIN OF RIGHT KNEE: ICD-10-CM

## 2018-08-09 DIAGNOSIS — I10 BENIGN ESSENTIAL HTN: ICD-10-CM

## 2018-08-09 DIAGNOSIS — E78.2 MIXED HYPERLIPIDEMIA: ICD-10-CM

## 2018-08-09 PROCEDURE — G8427 DOCREV CUR MEDS BY ELIG CLIN: HCPCS | Performed by: FAMILY MEDICINE

## 2018-08-09 PROCEDURE — 1123F ACP DISCUSS/DSCN MKR DOCD: CPT | Performed by: FAMILY MEDICINE

## 2018-08-09 PROCEDURE — G8400 PT W/DXA NO RESULTS DOC: HCPCS | Performed by: FAMILY MEDICINE

## 2018-08-09 PROCEDURE — 1036F TOBACCO NON-USER: CPT | Performed by: FAMILY MEDICINE

## 2018-08-09 PROCEDURE — G8420 CALC BMI NORM PARAMETERS: HCPCS | Performed by: FAMILY MEDICINE

## 2018-08-09 PROCEDURE — 1101F PT FALLS ASSESS-DOCD LE1/YR: CPT | Performed by: FAMILY MEDICINE

## 2018-08-09 PROCEDURE — 1090F PRES/ABSN URINE INCON ASSESS: CPT | Performed by: FAMILY MEDICINE

## 2018-08-09 PROCEDURE — 4040F PNEUMOC VAC/ADMIN/RCVD: CPT | Performed by: FAMILY MEDICINE

## 2018-08-09 PROCEDURE — 99214 OFFICE O/P EST MOD 30 MIN: CPT | Performed by: FAMILY MEDICINE

## 2018-08-09 ASSESSMENT — ENCOUNTER SYMPTOMS
EYES NEGATIVE: 1
SHORTNESS OF BREATH: 0
DIARRHEA: 0
VOMITING: 0
CONSTIPATION: 0
ABDOMINAL PAIN: 0
COUGH: 0
COLOR CHANGE: 0
WHEEZING: 0
NAUSEA: 0
ABDOMINAL DISTENTION: 0
BLOOD IN STOOL: 0
CHEST TIGHTNESS: 0

## 2018-08-09 NOTE — PATIENT INSTRUCTIONS
Please consider getting Shingrix. Please call your [place] office and let us know when you had Tdap and Prevnar 13.

## 2018-08-13 ENCOUNTER — HOSPITAL ENCOUNTER (OUTPATIENT)
Dept: PHYSICAL THERAPY | Age: 83
Setting detail: THERAPIES SERIES
Discharge: HOME | End: 2018-08-13
Payer: MEDICARE

## 2018-08-13 PROCEDURE — 97116 GAIT TRAINING THERAPY: CPT

## 2018-08-13 PROCEDURE — 97112 NEUROMUSCULAR REEDUCATION: CPT

## 2018-08-13 NOTE — FLOWSHEET NOTE
Physical Therapy Daily Treatment Note    Date:  2018    Patient Name:  Laurie Jones    :  1932  MRN: [id number]  Restrictions/Precautions:  universal  Medical/Treatment Diagnosis Information:  · Diagnosis: OA knee  Insurance/Certification information:  PT Insurance Information: Medicare  Physician Information:  Referring Practitioner: Jessie Mars MD  Plan of care signed (Y/N):  N  Visit# / total visits:  14/mn     G-Code (if applicable):         PT G-Codes  Functional Assessment Tool Used: LEFS  Score: 57 (was 48)  Functional Limitation: Mobility: Walking and moving around  Mobility: Walking and Moving Around Current Status (): At least 20 percent but less than 40 percent impaired, limited or restricted  Mobility: Walking and Moving Around Goal Status (): At least 1 percent but less than 20 percent impaired, limited or restricted    Medicare Cap (if applicable):  $539= total amount used, updated 2018    Time in:   11:30am     Timed Treatment: 30min Total Treatment Time:  30min.  ________________________________________________________________________________________    Pain Level:    0/10  SUBJECTIVE: Patient reports \"I feel pretty good, just tired\". \"I see the doctor this week\". Reports compliance with HEP.         OBJECTIVE:     Exercise/Equipment Resistance/Repetitions Other comments    NuStep warm-up  Level 5 x5min                  Bridge   HEP   SLR w/ quad set HEP   Quad set HEP   Heel slide HEP             Mini-squat    HS curls    Hip 3 way       Step up 6 \" step     Rockerboard Tapping x20 f/b,s/s  Balance 3a06nkr f/b,s/s   Balance Blue balance foam EC 0p42gbx ea DLS  [place] EO 8V89 sec ea  EC x30sec (B) SLS  Mini-squat x10             Gait Training   Uphill/downhill, gait propulsion, stairs                   Recumbent bike    For ROM                Test/Measurements:  --       ASSESSMENT:  The patient performed above balance and gait activity well with SBA/CGA and

## 2018-08-15 ENCOUNTER — HOSPITAL ENCOUNTER (OUTPATIENT)
Dept: PHYSICAL THERAPY | Age: 83
Setting detail: THERAPIES SERIES
Discharge: HOME | End: 2018-08-15
Payer: MEDICARE

## 2018-08-15 PROCEDURE — 97110 THERAPEUTIC EXERCISES: CPT

## 2018-08-15 PROCEDURE — 97112 NEUROMUSCULAR REEDUCATION: CPT

## 2018-08-15 NOTE — FLOWSHEET NOTE
Physical Therapy Daily Treatment Note    Date:  8/15/2018    Patient Name:  Michael Claire    :  1932  MRN: [id number]  Restrictions/Precautions:  universal  Medical/Treatment Diagnosis Information:  · Diagnosis: OA knee  Insurance/Certification information:  PT Insurance Information: Medicare  Physician Information:  Referring Practitioner: Frankey Grimmer, MD  Plan of care signed (Y/N):  N  Visit# / total visits:  15/mn     G-Code (if applicable):         PT G-Codes  Functional Assessment Tool Used: LEFS  Score: 57 (was 48)  Functional Limitation: Mobility: Walking and moving around  Mobility: Walking and Moving Around Current Status (): At least 20 percent but less than 40 percent impaired, limited or restricted  Mobility: Walking and Moving Around Goal Status (): At least 1 percent but less than 20 percent impaired, limited or restricted    Medicare Cap (if applicable):  $793= total amount used, updated 8/15/2018    Time in:   11:30am     Timed Treatment: 30min Total Treatment Time:  30min.  ________________________________________________________________________________________    Pain Level:    0/10  SUBJECTIVE: Patient reports \"I feel a lot better than I did the other day\". \"I am supposed to see the doctor tomorrow\". Reports compliance with HEP.         OBJECTIVE:     Exercise/Equipment Resistance/Repetitions Other comments    NuStep warm-up  Level 5 x5min                  Bridge   HEP   SLR w/ quad set HEP   Quad set HEP   Heel slide HEP             Mini-squat    HS curls x10B w/ 2#    Hip 3 way    x10ea (B) w/ 2#    Step up 6 \" step  2x10 (B)    Rockerboard Tapping x20 f/b,s/s  Balance 1m54zqe f/b,s/s   Balance Blue balance foam EC 7r44ggp ea DLS  [place] EO 7R05 sec ea  EO x30sec (B) SLS  Mini-squat x10             Gait Training                     Recumbent bike    For ROM                Test/Measurements:  --       ASSESSMENT:  The patient performed above TE well with no increase in pain.  The patient adapted nicely to added challenge this date. Required SBA/CGA with standing balance activity. No pain reported at end of PT session. Treatment/Activity Tolerance:   [x] Patient tolerated treatment well [] Patient limited by fatique  [] Patient limited by pain [] Patient limited by other medical complications  [] Other:     Goals:    Short term goals  Time Frame for Short term goals: 4 weeks  Short term goal 1: patient to be independent with HEP to self manage symptoms. (Partly met)  Short term goal 2: patient to improve (R) LE strength to at least 4/5 grossly to improve gait. (MET)  Short term goal 3: patient to improve (R) LE ROM to equal (L) to improve gait mechanics. (NOT MET)  Short term goal 4: patient to improve LEFS by at least 10 points demonstrating improved participation in everyday activity. (partly met)     Long term goals  Time Frame for Long term goals : 8 weeks  Long term goal 1: patient to improve (B) LE strength to at least 4+/5 grossly to improve gait. Long term goal 2: patient to improve LEFS by at least 15 points demonstrating improved participation in everyday activity. Long term goal 3: patient to demonstrate a TUG score of less than 15sec demonstrating improved gait elias and stability. Plan: [x] Continue per plan of care [] Alter current plan (see comments)   [] Plan of care initiated [] Hold pending MD visit [] Discharge      Plan for Next Session: Biomechanical correction of problems as they present. Facilitate correct muscle firing patterns and activation with appropriate activities. Progress patient as tolerated.        Re-Certification Due Date:  8/23/18    Signature:  Rocio Sal, PT, DPT

## 2018-08-16 ENCOUNTER — OFFICE VISIT (OUTPATIENT)
Dept: ORTHOPEDIC SURGERY | Age: 83
End: 2018-08-16

## 2018-08-16 VITALS
BODY MASS INDEX: 24.24 KG/M2 | HEIGHT: 64 IN | DIASTOLIC BLOOD PRESSURE: 83 MMHG | SYSTOLIC BLOOD PRESSURE: 153 MMHG | WEIGHT: 141.98 LBS | HEART RATE: 80 BPM

## 2018-08-16 DIAGNOSIS — M25.561 PAIN IN JOINT OF RIGHT KNEE: Primary | ICD-10-CM

## 2018-08-16 DIAGNOSIS — M17.11 PRIMARY OSTEOARTHRITIS OF RIGHT KNEE: ICD-10-CM

## 2018-08-16 PROCEDURE — 1123F ACP DISCUSS/DSCN MKR DOCD: CPT | Performed by: ORTHOPAEDIC SURGERY

## 2018-08-16 PROCEDURE — G8427 DOCREV CUR MEDS BY ELIG CLIN: HCPCS | Performed by: ORTHOPAEDIC SURGERY

## 2018-08-16 PROCEDURE — 1036F TOBACCO NON-USER: CPT | Performed by: ORTHOPAEDIC SURGERY

## 2018-08-16 PROCEDURE — G8420 CALC BMI NORM PARAMETERS: HCPCS | Performed by: ORTHOPAEDIC SURGERY

## 2018-08-16 PROCEDURE — 4040F PNEUMOC VAC/ADMIN/RCVD: CPT | Performed by: ORTHOPAEDIC SURGERY

## 2018-08-16 PROCEDURE — 1090F PRES/ABSN URINE INCON ASSESS: CPT | Performed by: ORTHOPAEDIC SURGERY

## 2018-08-16 PROCEDURE — 1101F PT FALLS ASSESS-DOCD LE1/YR: CPT | Performed by: ORTHOPAEDIC SURGERY

## 2018-08-16 PROCEDURE — 99203 OFFICE O/P NEW LOW 30 MIN: CPT | Performed by: ORTHOPAEDIC SURGERY

## 2018-08-16 RX ORDER — TIZANIDINE 4 MG/1
4 TABLET ORAL DAILY
Qty: 30 TABLET | Refills: 0 | Status: SHIPPED | OUTPATIENT
Start: 2018-08-16 | End: 2019-08-16

## 2018-08-16 NOTE — PROGRESS NOTES
Chief Complaint    Knee Pain (RIGHT knee injury while in [place] 3/5/18, meniscus surgery 4/17 in [place], [place]; PT (land and aquatic) here beginning 6/20/18, has completed program; continues to have pain with steps and sleep disturbances)      History of Present Illness: Mary Chavez is a 80 y.o. female she is here today as a new patient visit for her right knee she was referred by her PCP . She had a left knee injury on March 5, 2018 when she went to rise from a chair. She saw a [place] He did an aspiration of her effusion and an injection of the steroid. She then underwent a left knee arthroscopy with partial meniscectomy on 4/17/2018. She actually has gotten better since her surgery but she has not experienced complete relief yet. She has pain going down stairs and has difficulty getting comfortable at night. Her pain however is mild. She is here today mainly to see if her meniscus surgery was done well and correctly. She has done a course of physical therapy with our [facility] physical therapist.  She is overall much better than she was prior to her injury. She just has some concerns regarding her surgery and if her knee will be better that it is now. She does have a history of diabetes and a history of glaucoma for which she takes eyedrops medications and sees an ophthalmologist on a somewhat regular basis. Pain Assessment  Location of Pain: Knee  Location Modifiers: Right  Severity of Pain: 3  Quality of Pain: Dull, Aching  Duration of Pain: Persistent  Frequency of Pain: Intermittent  Aggravating Factors: Stairs, Bending  Limiting Behavior: Some  Relieving Factors: Rest]       Medical History:  Patient's medications, allergies, past medical, surgical, social and family histories were reviewed and updated as appropriate.     Review of Systems:  Relevant review of systems reviewed and available in the patient's chart    Vital Signs:  Vitals:    08/16/18 0905   BP: (!) There is significant lateral joint space narrowing with sclerosis and osteophyte formation present. The patellofemoral joint with joint space narrowing and osteophyte formation present, mild valgus alignment overall  No fractures are identified. Impression:  Encounter Diagnosis   Name Primary?  Pain in joint of right knee Yes       Office Procedures:  Orders Placed This Encounter   Procedures    XR KNEE RIGHT (MIN 4 VIEWS)    PT aquatic therapy     Scheduling Instructions:      Esmer Adan [facility]      [address], [address]      [phone]            EVAL AND TREAT:  RIGHT knee OA            Modalities of Choice            2-3 times/week for 4-6 weeks       Treatment Plan:  I discussed with the patient the nature of osteoarthritis of the knee. We talked about treatment of arthritis and the various options that are involved with this. The patient understands that the treatments can vary from essentially doing nothing to a total joint replacement arthroplasty for arthritis. I then went on to describe the utilization of glucosamine and chondroitin sulfate as a joint nutrition product. We talked about the fact that this is essentially a joint vitamin with typically minimal side effects. We also talked about utilization of prescription over-the-counter anti-inflammatory medications as the next option. We also discussed the possibility of brace wear or orthotic wear if the patient has significant varus alignment. We then went on to discuss the possibility of Visco supplementation with hyaluronate products. We talked about the typical course of this type of treatment and the fact that often times in the treatment for significant arthritis, this is successful less than half the time.  We also talked about the corticosteroid injections and the fact that this can give a brief window of relief, but does not cure the problem; in fact, the pain often has a rebound effect in 6-10 weeks after the steroid has worn off. We also discussed arthroscopy surgery in attempts to debride the joint, but the fact that this is relatively unreliable treatment in the face of significant arthritis. It can occasionally be used, particularly if there is significant meniscus pathology. Lastly we discussed total joint replacement arthroplasty as the final and definitive step in treatment of arthritis. Patient realizes the magnitude of this type of treatment as well as having voiced a general understanding to the duration of the prosthesis. The patient voiced understanding to these continuum of treatment options. The patient was counseled in detail regarding her condition. She was provided reassurance regarding her meniscus surgery. She is better now than she was preoperatively. She was explained in detail that she does have severe arthritis of her right knee. Further treatment options would include injections and physical therapy and possible knee replacement surgery. Knee replacement surgery was discussed with the patient today however she does not feel like she is ready for this option. We'll see her back in a p.r.n. Basis if she needs an injection. We provided her with a prescription for a muscle relaxer today in clinic.

## 2018-08-21 ENCOUNTER — TELEPHONE (OUTPATIENT)
Dept: FAMILY MEDICINE CLINIC | Age: 83
End: 2018-08-21

## 2018-08-21 ENCOUNTER — HOSPITAL ENCOUNTER (OUTPATIENT)
Dept: PHYSICAL THERAPY | Age: 83
Setting detail: THERAPIES SERIES
Discharge: HOME | End: 2018-08-21
Payer: MEDICARE

## 2018-08-21 PROCEDURE — G8978 MOBILITY CURRENT STATUS: HCPCS

## 2018-08-21 PROCEDURE — G8979 MOBILITY GOAL STATUS: HCPCS

## 2018-08-21 PROCEDURE — 97110 THERAPEUTIC EXERCISES: CPT

## 2018-08-21 NOTE — TELEPHONE ENCOUNTER
Pt states she in currently in \"donut hole\" with her insurance and is requesting [facility] ? Pt is interesting in the below medications because they are cheaper:  Pioglitozone  Hydrochloride  Glimepiride  Please call patient/ Please advise.   Last Seen 8/9/18

## 2018-08-22 ENCOUNTER — TELEPHONE (OUTPATIENT)
Dept: FAMILY MEDICINE CLINIC | Age: 83
End: 2018-08-22

## 2018-08-22 NOTE — FLOWSHEET NOTE
Physical Therapy Aquatic Flow Sheet  Date:  8/22/2018    Patient Name:  Catalina Maxwell    Restrictions: universal    Diagnosis:  OA knee  Treatment Diagnosis:  Knee pain  Insurance/Certification information:  Medicare  Plan of care signed (Y/N):  Y  Visit# / total visits:  16/mn  Pain level: /10   Electronically signed by:  Dexter Dick PT    Medicare Cap (if applicable):  $6692 = total amount used, updated 8/22/2018    Key  B= Belt DB= Dumbells T= Theratube   H= Hydrotone N= Noodles W= Weights   P= Paddles S= Speedo equipment K= Kickboard     Exercises/Activities   Warm-up/Amb    Exercises      Slow forward  nv  HR/TR  nv    Slow sideways  nv  Marches  nv    Slow backwards  nv  Mini-squats  nv    Medium forward    4-way SLR  nv    Medium sideways    Hip circles/fig 8  nv    Small shuffle    Hamstring curls  nv    Jog    Knee extension      Braiding    Pelvic tilts      Bicycling  nv  Scap squeezes          Shoulder flex/ext      Functional    Shoulder abd/add      Step  nv  Shoulder H. abd/add      Lifting    Shoulder IR/ER      Hand to opp knee    Rowing      Push down squat    Bilateral pull down      UE PNF    Push/pull      LE PNF    Push downs      Wall push ups    Arm circles      SLS  nv  Elbow flex/ext          Chin tuck      Stretching    UT shrugs/rolls      Gastroc/Soleus  nv  Rocking horse      Hamstring  nv        SKTC    Other      Piriformis          Hip flexor          Ladder pull          Pec stretch          Post deltoid           Time In:      Timed Code Treatment Minutes:       Total Treatment Minutes:      Treatment/Activity Tolerance:   [x] Patient tolerated treatment well [] Patient limited by fatigue   [] Patient limited by pain [] Patient limited by other medical complications  [] Other:     Prognosis: [x] Good [] Fair  [] Poor    Patient Requires Follow-up:  [x] Yes  [] No    Plan: [x] Continue per plan of care [] Alter current plan (see comments)   [] Plan of care initiated []
Test/Measurements:    PROM RLE (degrees)  RLE General PROM: Knee flexion: 0*-125* (was -6* from 0* to 125*)  AROM RLE (degrees)  RLE General AROM: Knee flexion: 0-120* (was -9* from 0* to 115*)  AROM LLE (degrees)  LLE General AROM: Knee flexion: 0-130* (was 0-125*)     Strength RLE              R Hip Flexion: 4/5 (was 4-/5)              R Hip Extension: 4/5 (was 4-/5)              R Hip ABduction: 4/5 (was 4-/5)              R Hip ADduction: 4/5 (was 4-/5)              R Knee Flexion: 4/5 (was 4-/5)              R Knee Extension: 4/5 (was 4-/5)              R Ankle Dorsiflexion: 4/5 (was 4-/5)              R Ankle Plantar flexion: 4/5 (was 4-/5)  Strength LLE              L Hip Flexion: 4+/5 (was 4/5)              L Hip Extension: 4+/5 (was 4/5)              L Hip ABduction: 4+/5 (was 4/5)              L Hip ADduction: 4+/5 (was 4/5)              L Knee Flexion: 4+/5 (was 4/5)              L Knee Extension: 4+/5 (was 4/5)              L Ankle Dorsiflexion: 4+/5 (was 4/5)              L Ankle Plantar Flexion: 4+/5 (was 4/5)     Additional Measures  Special Tests: TUsec (was 16.63sec) w/o AD  Timed sit to stand (10): 21sec  (was 26.32sec.)      ASSESSMENT:  The patient performed above TE well with no increase in pain. Required SBA/CGA with standing balance activity. No pain reported at end of PT session. The patient will transition to pool therapy starting next week. Treatment/Activity Tolerance:   [x] Patient tolerated treatment well [] Patient limited by fatique  [] Patient limited by pain [] Patient limited by other medical complications  [] Other:     Goals:    Short term goals  Time Frame for Short term goals: 4 weeks  Short term goal 1: patient to be independent with HEP to self manage symptoms. (Partly met)  Short term goal 2: patient to improve (R) LE strength to at least 4/5 grossly to improve gait. (MET)  Short term goal 3: patient to improve (R) LE ROM to equal (L) to improve gait mechanics.

## 2018-08-27 NOTE — TELEPHONE ENCOUNTER
Pt states she will finish [place] and [place] out then switch over to metformin 2 po bid. Pt will call for refill on metformin when she is close to being out.  fyi

## 2018-08-28 ENCOUNTER — HOSPITAL ENCOUNTER (OUTPATIENT)
Dept: PHYSICAL THERAPY | Age: 83
Setting detail: THERAPIES SERIES
Discharge: HOME | End: 2018-08-28
Payer: MEDICARE

## 2018-08-28 PROCEDURE — 97113 AQUATIC THERAPY/EXERCISES: CPT

## 2018-08-28 PROCEDURE — 97150 GROUP THERAPEUTIC PROCEDURES: CPT

## 2018-08-30 ENCOUNTER — HOSPITAL ENCOUNTER (OUTPATIENT)
Dept: PHYSICAL THERAPY | Age: 83
Setting detail: THERAPIES SERIES
Discharge: HOME | End: 2018-08-30
Payer: MEDICARE

## 2018-08-30 PROCEDURE — 97150 GROUP THERAPEUTIC PROCEDURES: CPT

## 2018-08-30 PROCEDURE — 97113 AQUATIC THERAPY/EXERCISES: CPT

## 2018-08-30 NOTE — FLOWSHEET NOTE
care [] Alter current plan (see comments)   [] Plan of care initiated [] Hold pending MD visit [] Discharge    See Weekly Progress Note: [] Yes  [x] No  Next due:

## 2018-09-04 ENCOUNTER — HOSPITAL ENCOUNTER (OUTPATIENT)
Dept: PHYSICAL THERAPY | Age: 83
Setting detail: THERAPIES SERIES
Discharge: HOME | End: 2018-09-04
Payer: MEDICARE

## 2018-09-04 PROCEDURE — 97150 GROUP THERAPEUTIC PROCEDURES: CPT

## 2018-09-04 PROCEDURE — 97113 AQUATIC THERAPY/EXERCISES: CPT

## 2018-09-04 NOTE — FLOWSHEET NOTE
Physical Therapy Aquatic Flow Sheet  Date:  9/4/2018    Patient Name:  Catalina Maxwell    Restrictions: universal    Diagnosis:  OA knee  Treatment Diagnosis:  Knee pain  Insurance/Certification information:  Medicare  Plan of care signed (Y/N):  Y  Visit# / total visits:  19/mn  Pain level: 0/10   Electronically signed by:  Dexter Dikc PT    Medicare Cap (if applicable):  $0359 = total amount used, updated 9/4/2018    Key  B= Belt DB= Dumbells T= Theratube   H= Hydrotone N= Noodles W= Weights   P= Paddles S= Speedo equipment K= Kickboard     Exercises/Activities   Warm-up/Amb    Exercises      Slow forward  2 laps  HR/TR  x15    Slow sideways  2 laps  Marches  x2min    Slow backwards  2 laps  Mini-squats x15    Medium forward    3-way SLR  x15B    Medium sideways    Hip circles/fig 8  x15B    Small shuffle    Hamstring curls  x15B    Jog    Knee extension      Braiding    Pelvic tilts      Bicycling  x2min  Scap squeezes          Shoulder flex/ext      Functional    Shoulder abd/add      Step  x15B  Shoulder H. abd/add      Lifting    Shoulder IR/ER      Hand to opp knee    Rowing      Push down squat    Bilateral pull down      UE PNF    Push/pull      LE PNF    Push downs      Wall push ups    Arm circles      SLS  2x1min ea (B)  Elbow flex/ext          Chin tuck      Stretching    UT shrugs/rolls      Gastroc/Soleus  [id number] (B)  Rocking horse      Hamstring  [id number] (B)        SKTC    Other      Piriformis          Hip flexor          Ladder pull          Pec stretch          Post deltoid           Time In:  2:28pm    Timed Code Treatment Minutes:  15min.     Total Treatment Minutes:  32min    Treatment/Activity Tolerance:   [x] Patient tolerated treatment well [] Patient limited by fatigue   [] Patient limited by pain [] Patient limited by other medical complications  [] Other:     Prognosis: [x] Good [] Fair  [] Poor    Patient Requires Follow-up:  [x] Yes  [] No    Plan: [x] Continue per plan of

## 2018-09-05 ENCOUNTER — TELEPHONE (OUTPATIENT)
Dept: FAMILY MEDICINE CLINIC | Age: 83
End: 2018-09-05

## 2018-09-05 NOTE — TELEPHONE ENCOUNTER
Patient called and wants Crystal to call her back. Patient states that she has the information Dr. Rey Hutchins needed about her shots. Patient did not want to give me the information.

## 2018-09-06 ENCOUNTER — HOSPITAL ENCOUNTER (OUTPATIENT)
Dept: PHYSICAL THERAPY | Age: 83
Setting detail: THERAPIES SERIES
Discharge: HOME | End: 2018-09-06
Payer: MEDICARE

## 2018-09-06 PROCEDURE — 97150 GROUP THERAPEUTIC PROCEDURES: CPT

## 2018-09-06 PROCEDURE — 97113 AQUATIC THERAPY/EXERCISES: CPT

## 2018-09-06 NOTE — FLOWSHEET NOTE
Physical Therapy Aquatic Flow Sheet  Date:  9/6/2018    Patient Name:  Tashi Lainez    Restrictions: universal    Diagnosis:  OA knee  Treatment Diagnosis:  Knee pain  Insurance/Certification information:  Medicare  Plan of care signed (Y/N):  Y  Visit# / total visits:  20/mn  Pain level: 0/10   Electronically signed by:  Daily Maretll PT    Medicare Cap (if applicable):  $7006 = total amount used, updated 9/6/2018    Key  B= Belt DB= Dumbells T= Theratube   H= Hydrotone N= Noodles W= Weights   P= Paddles S= Speedo equipment K= Kickboard     Exercises/Activities   Warm-up/Amb    Exercises      Slow forward  2 laps  HR/TR  x15    Slow sideways  2 laps  Marches  x2min    Slow backwards  2 laps  Mini-squats x15    Medium forward    3-way SLR  x15B    Medium sideways    Hip circles/fig 8  x15B    Small shuffle    Hamstring curls  x15B    Jog    Knee extension      Braiding    Pelvic tilts      Bicycling  x2min  Scap squeezes          Shoulder flex/ext      Functional    Shoulder abd/add      Step  x15B  Shoulder H. abd/add      Lifting    Shoulder IR/ER      Hand to opp knee    Rowing      Push down squat    Bilateral pull down      UE PNF    Push/pull      LE PNF    Push downs      Wall push ups    Arm circles      SLS  2x1min ea (B)  Elbow flex/ext          Chin tuck      Stretching    UT shrugs/rolls      Gastroc/Soleus  [id number] (B)  Rocking horse      Hamstring  [id number] (B)        SKTC    Other      Piriformis          Hip flexor          Ladder pull          Pec stretch          Post deltoid           Time In:  2:27pm    Timed Code Treatment Minutes:  15min.     Total Treatment Minutes:  33min    Treatment/Activity Tolerance:   [x] Patient tolerated treatment well [] Patient limited by fatigue   [] Patient limited by pain [] Patient limited by other medical complications  [] Other:     Prognosis: [x] Good [] Fair  [] Poor    Patient Requires Follow-up:  [x] Yes  [] No    Plan: [x] Continue per plan of care [] Alter current plan (see comments)   [] Plan of care initiated [] Hold pending MD visit [] Discharge    See Weekly Progress Note: [] Yes  [x] No  Next due:

## 2018-09-11 ENCOUNTER — HOSPITAL ENCOUNTER (OUTPATIENT)
Dept: PHYSICAL THERAPY | Age: 83
Setting detail: THERAPIES SERIES
Discharge: HOME | End: 2018-09-11
Payer: MEDICARE

## 2018-09-11 PROCEDURE — 97113 AQUATIC THERAPY/EXERCISES: CPT

## 2018-09-11 PROCEDURE — 97150 GROUP THERAPEUTIC PROCEDURES: CPT

## 2018-09-11 NOTE — FLOWSHEET NOTE
Physical Therapy Aquatic Flow Sheet  Date:  9/11/2018    Patient Name:  Neto Wong    Restrictions: universal    Diagnosis:  OA knee  Treatment Diagnosis:  Knee pain  Insurance/Certification information:  Medicare  Plan of care signed (Y/N):  Y  Visit# / total visits:  20/mn  Pain level: 0/10   Electronically signed by:  Manolo West PT    Medicare Cap (if applicable):  $9799 = total amount used, updated 9/11/2018    Key  B= Belt DB= Dumbells T= Theratube   H= Hydrotone N= Noodles W= Weights   P= Paddles S= Speedo equipment K= Kickboard     Exercises/Activities   Warm-up/Amb    Exercises      Slow forward  2 laps  HR/TR  x15    Slow sideways  2 laps  Marches  x2min    Slow backwards  2 laps  Mini-squats x15    Medium forward    3-way SLR  x15B    Medium sideways    Hip circles/fig 8  x15B    Small shuffle    Hamstring curls  x15B    Jog    Knee extension      Braiding    Pelvic tilts      Bicycling  x2min  Scap squeezes          Shoulder flex/ext      Functional    Shoulder abd/add      Step  x15B  Shoulder H. abd/add      Lifting    Shoulder IR/ER      Hand to opp knee    Rowing      Push down squat    Bilateral pull down      UE PNF    Push/pull      LE PNF    Push downs      Wall push ups    Arm circles      SLS  2x1min ea (B)  Elbow flex/ext          Chin tuck      Stretching    UT shrugs/rolls      Gastroc/Soleus  [id number] (B)  Rocking horse      Hamstring  [id number] (B)        SKTC    Other      Piriformis          Hip flexor          Ladder pull          Pec stretch          Post deltoid           Time In:  2:27pm    Timed Code Treatment Minutes:  15min.     Total Treatment Minutes:  33min    Treatment/Activity Tolerance:   [x] Patient tolerated treatment well [] Patient limited by fatigue   [] Patient limited by pain [] Patient limited by other medical complications  [] Other:     Prognosis: [x] Good [] Fair  [] Poor    Patient Requires Follow-up:  [x] Yes  [] No    Plan: [x] Continue per plan of care [] Alter current plan (see comments)   [] Plan of care initiated [] Hold pending MD visit [] Discharge    See Weekly Progress Note: [] Yes  [x] No  Next due:

## 2018-09-18 ENCOUNTER — HOSPITAL ENCOUNTER (OUTPATIENT)
Dept: PHYSICAL THERAPY | Age: 83
Setting detail: THERAPIES SERIES
Discharge: HOME | End: 2018-09-18
Payer: MEDICARE

## 2018-09-18 PROCEDURE — 97150 GROUP THERAPEUTIC PROCEDURES: CPT

## 2018-09-18 PROCEDURE — 97113 AQUATIC THERAPY/EXERCISES: CPT

## 2018-09-18 NOTE — FLOWSHEET NOTE
Physical Therapy Aquatic Flow Sheet  Date:  9/18/2018    Patient Name:  Mary Chavez    Restrictions: universal    Diagnosis:  OA knee  Treatment Diagnosis:  Knee pain  Insurance/Certification information:  Medicare  Plan of care signed (Y/N):  Y  Visit# / total visits:  21/mn  Pain level: 0/10   Electronically signed by:  Elias May PT    Medicare Cap (if applicable):  $9586 = total amount used, updated 9/18/2018    Key  B= Belt DB= Dumbells T= Theratube   H= Hydrotone N= Noodles W= Weights   P= Paddles S= Speedo equipment K= Kickboard     Exercises/Activities   Warm-up/Amb    Exercises      Slow forward  2 laps  HR/TR  x15    Slow sideways  2 laps  Marches  x2min    Slow backwards  2 laps  Mini-squats x15    Medium forward    3-way SLR  x15B    Medium sideways    Hip circles/fig 8  x15B    Small shuffle    Hamstring curls  x15B    Jog    Knee extension      Braiding    Pelvic tilts      Bicycling  x2min  Scap squeezes          Shoulder flex/ext      Functional    Shoulder abd/add      Step  x15B  Shoulder H. abd/add      Lifting    Shoulder IR/ER      Hand to opp knee    Rowing      Push down squat    Bilateral pull down      UE PNF    Push/pull      LE PNF    Push downs      Wall push ups    Arm circles      SLS  2x1min ea (B)  Elbow flex/ext          Chin tuck      Stretching    UT shrugs/rolls      Gastroc/Soleus  [id number] (B)  Rocking horse      Hamstring  [id number] (B)        SKTC    Other      Piriformis          Hip flexor          Ladder pull          Pec stretch          Post deltoid           Time In:  2:30pm    Timed Code Treatment Minutes:  15min.     Total Treatment Minutes:  34min    Treatment/Activity Tolerance:   [x] Patient tolerated treatment well [] Patient limited by fatigue   [] Patient limited by pain [] Patient limited by other medical complications  [] Other:     Prognosis: [x] Good [] Fair  [] Poor    Patient Requires Follow-up:  [x] Yes  [] No    Plan: [x] Continue per plan of

## 2018-09-20 ENCOUNTER — APPOINTMENT (OUTPATIENT)
Dept: PHYSICAL THERAPY | Age: 83
End: 2018-09-20
Payer: MEDICARE

## 2018-09-20 ENCOUNTER — HOSPITAL ENCOUNTER (OUTPATIENT)
Dept: PHYSICAL THERAPY | Age: 83
Setting detail: THERAPIES SERIES
Discharge: HOME | End: 2018-09-20
Payer: MEDICARE

## 2018-09-20 PROCEDURE — 97113 AQUATIC THERAPY/EXERCISES: CPT

## 2018-09-20 PROCEDURE — 97150 GROUP THERAPEUTIC PROCEDURES: CPT

## 2018-09-20 NOTE — FLOWSHEET NOTE
Physical Therapy Aquatic Flow Sheet  Date:  9/20/2018    Patient Name:  Mikael Castaneda    Restrictions: universal    Diagnosis:  OA knee  Treatment Diagnosis:  Knee pain  Insurance/Certification information:  Medicare  Plan of care signed (Y/N):  Y  Visit# / total visits:  22/mn  Pain level: 0/10   Electronically signed by:  Guicho Mcnally PT    Medicare Cap (if applicable):  $5052 = total amount used, updated 9/20/2018    Key  B= Belt DB= Dumbells T= Theratube   H= Hydrotone N= Noodles W= Weights   P= Paddles S= Speedo equipment K= Kickboard     Exercises/Activities   Warm-up/Amb    Exercises      Slow forward  2 laps  HR/TR  x15    Slow sideways  2 laps  Marches  x2min    Slow backwards  2 laps  Mini-squats x15    Medium forward    3-way SLR  x15B    Medium sideways    Hip circles/fig 8  x15B    Small shuffle    Hamstring curls  x15B    Jog    Knee extension      Braiding    Pelvic tilts      Bicycling  x2min  Scap squeezes          Shoulder flex/ext      Functional    Shoulder abd/add      Step  x15B  Shoulder H. abd/add      Lifting    Shoulder IR/ER      Hand to opp knee    Rowing      Push down squat    Bilateral pull down      UE PNF    Push/pull      LE PNF    Push downs      Wall push ups    Arm circles      SLS  2x1min ea (B)  Elbow flex/ext          Chin tuck      Stretching    UT shrugs/rolls      Gastroc/Soleus  [id number] (B)  Rocking horse      Hamstring  [id number] (B)        SKTC    Other      Piriformis          Hip flexor          Ladder pull          Pec stretch          Post deltoid           Time In:  2:25pm    Timed Code Treatment Minutes:  15min.     Total Treatment Minutes:  32min    Treatment/Activity Tolerance:   [x] Patient tolerated treatment well [] Patient limited by fatigue   [] Patient limited by pain [] Patient limited by other medical complications  [] Other:     Prognosis: [x] Good [] Fair  [] Poor    Patient Requires Follow-up:  [x] Yes  [] No    Plan: [x] Continue per plan of care [] Alter current plan (see comments)   [] Plan of care initiated [] Hold pending MD visit [] Discharge    See Weekly Progress Note: [] Yes  [x] No  Next due:

## 2018-09-25 ENCOUNTER — HOSPITAL ENCOUNTER (OUTPATIENT)
Dept: PHYSICAL THERAPY | Age: 83
Setting detail: THERAPIES SERIES
Discharge: HOME | End: 2018-09-25
Payer: MEDICARE

## 2018-09-25 PROCEDURE — 97150 GROUP THERAPEUTIC PROCEDURES: CPT

## 2018-09-25 PROCEDURE — 97113 AQUATIC THERAPY/EXERCISES: CPT

## 2018-09-25 NOTE — FLOWSHEET NOTE
Physical Therapy Aquatic Flow Sheet  Date:  9/25/2018    Patient Name:  John Hobbs    Restrictions: universal    Diagnosis:  OA knee  Treatment Diagnosis:  Knee pain  Insurance/Certification information:  Medicare  Plan of care signed (Y/N):  Y  Visit# / total visits:  23/mn  Pain level: 0/10   Electronically signed by:  Erasmo Hutchins PT    Medicare Cap (if applicable):  $4941 = total amount used, updated 9/25/2018    Key  B= Belt DB= Dumbells T= Theratube   H= Hydrotone N= Noodles W= Weights   P= Paddles S= Speedo equipment K= Kickboard     Exercises/Activities   Warm-up/Amb    Exercises      Slow forward  2 laps  HR/TR  x15    Slow sideways  2 laps  Marches  x2min    Slow backwards  2 laps  Mini-squats x15    Medium forward    3-way SLR  x15B    Medium sideways    Hip circles/fig 8  x15B    Small shuffle    Hamstring curls  x15B    Jog    Knee extension      Braiding    Pelvic tilts      Bicycling  x2min  Scap squeezes          Shoulder flex/ext      Functional    Shoulder abd/add      Step  x15B  Shoulder H. abd/add      Lifting    Shoulder IR/ER      Hand to opp knee    Rowing      Push down squat    Bilateral pull down      UE PNF    Push/pull      LE PNF    Push downs      Wall push ups    Arm circles      SLS  2x1min ea (B)  Elbow flex/ext          Chin tuck      Stretching    UT shrugs/rolls      Gastroc/Soleus  [id number] (B)  Rocking horse      Hamstring  [id number] (B)        SKTC    Other      Piriformis          Hip flexor          Ladder pull          Pec stretch          Post deltoid           Time In:  2:30pm    Timed Code Treatment Minutes:  15min.     Total Treatment Minutes:  30min    Treatment/Activity Tolerance:   [x] Patient tolerated treatment well [] Patient limited by fatigue   [] Patient limited by pain [] Patient limited by other medical complications  [] Other:     Prognosis: [x] Good [] Fair  [] Poor    Patient Requires Follow-up:  [x] Yes  [] No    Plan: [x] Continue per plan of care [] Alter current plan (see comments)   [] Plan of care initiated [] Hold pending MD visit [] Discharge    See Weekly Progress Note: [] Yes  [x] No  Next due:

## 2018-09-26 ENCOUNTER — OFFICE VISIT (OUTPATIENT)
Dept: FAMILY MEDICINE CLINIC | Age: 83
End: 2018-09-26
Payer: MEDICARE

## 2018-09-26 VITALS
BODY MASS INDEX: 24.58 KG/M2 | HEART RATE: 76 BPM | DIASTOLIC BLOOD PRESSURE: 60 MMHG | WEIGHT: 143.2 LBS | OXYGEN SATURATION: 99 % | SYSTOLIC BLOOD PRESSURE: 130 MMHG

## 2018-09-26 DIAGNOSIS — I83.90 VARICOSE VEIN OF LEG: Primary | ICD-10-CM

## 2018-09-26 DIAGNOSIS — E11.9 CONTROLLED TYPE 2 DIABETES MELLITUS WITHOUT COMPLICATION, WITHOUT LONG-TERM CURRENT USE OF INSULIN (HCC): ICD-10-CM

## 2018-09-26 DIAGNOSIS — Z23 NEED FOR INFLUENZA VACCINATION: ICD-10-CM

## 2018-09-26 PROCEDURE — G8420 CALC BMI NORM PARAMETERS: HCPCS | Performed by: FAMILY MEDICINE

## 2018-09-26 PROCEDURE — 1101F PT FALLS ASSESS-DOCD LE1/YR: CPT | Performed by: FAMILY MEDICINE

## 2018-09-26 PROCEDURE — 1123F ACP DISCUSS/DSCN MKR DOCD: CPT | Performed by: FAMILY MEDICINE

## 2018-09-26 PROCEDURE — 1090F PRES/ABSN URINE INCON ASSESS: CPT | Performed by: FAMILY MEDICINE

## 2018-09-26 PROCEDURE — 99213 OFFICE O/P EST LOW 20 MIN: CPT | Performed by: FAMILY MEDICINE

## 2018-09-26 PROCEDURE — G0008 ADMIN INFLUENZA VIRUS VAC: HCPCS | Performed by: FAMILY MEDICINE

## 2018-09-26 PROCEDURE — 90662 IIV NO PRSV INCREASED AG IM: CPT | Performed by: FAMILY MEDICINE

## 2018-09-26 PROCEDURE — 1036F TOBACCO NON-USER: CPT | Performed by: FAMILY MEDICINE

## 2018-09-26 PROCEDURE — 4040F PNEUMOC VAC/ADMIN/RCVD: CPT | Performed by: FAMILY MEDICINE

## 2018-09-26 PROCEDURE — G8427 DOCREV CUR MEDS BY ELIG CLIN: HCPCS | Performed by: FAMILY MEDICINE

## 2018-09-26 RX ORDER — PIOGLITAZONEHYDROCHLORIDE 15 MG/1
15 TABLET ORAL DAILY
Qty: 90 TABLET | Refills: 3 | Status: SHIPPED | OUTPATIENT
Start: 2018-09-26 | End: 2019-07-22

## 2018-09-26 ASSESSMENT — PATIENT HEALTH QUESTIONNAIRE - PHQ9
SUM OF ALL RESPONSES TO PHQ QUESTIONS 1-9: 0
SUM OF ALL RESPONSES TO PHQ QUESTIONS 1-9: 0
2. FEELING DOWN, DEPRESSED OR HOPELESS: 0
1. LITTLE INTEREST OR PLEASURE IN DOING THINGS: 0
SUM OF ALL RESPONSES TO PHQ9 QUESTIONS 1 & 2: 0

## 2018-09-26 NOTE — PROGRESS NOTES
Vaccine Information Sheet, \"Influenza - Inactivated\"  given to Mikael Castaneda, or parent/legal guardian of  Mikael Castaneda and verbalized understanding. Patient responses:    Have you ever had a reaction to a flu vaccine? No  Are you able to eat eggs without adverse effects? Yes  Do you have any current illness? No  Have you ever had [place] Syndrome? No    Flu vaccine given per order. Please see immunization tab.

## 2018-09-26 NOTE — PROGRESS NOTES
Patient: Nico Zhu is a 80 y.o. female who presents today with the following Chief Complaint(s):  Chief Complaint   Patient presents with    Other     For past week pt can feel blood running in vein down left leg. HPI: Pt is now in donut hole. Actos 15 was changed to [place] [facility] [place] 5/2017. Pt has no copay for Actos, wants rf until 1/2019. 5/18 A1C 7.3. Is moving to [place] fulltime in Nov. Will see her internist then for next a1c.    1 wk ago noted sensation as above in LLE. Can see vein, had not noted prior. Is concerned about poor circulation. No leg pain with walking or activity. Current Outpatient Prescriptions   Medication Sig Dispense Refill    pioglitazone (ACTOS) 15 MG tablet Take 1 tablet by mouth daily 90 tablet 3    tiZANidine (ZANAFLEX) 4 MG tablet Take 1 tablet by mouth daily 30 tablet 0    lisinopril (PRINIVIL;ZESTRIL) 2.5 MG tablet TAKE 1 TABLET BY MOUTH  DAILY 90 tablet 3    metFORMIN (GLUCOPHAGE) 500 MG tablet TAKE 1 TABLET BY MOUTH TWO  TIMES DAILY WITH MEALS 180 tablet 3    simvastatin (ZOCOR) 20 MG tablet TAKE 1 TABLET BY MOUTH  NIGHTLY 90 tablet 3    zolpidem (AMBIEN) 5 MG tablet Take 1 tablet by mouth nightly as needed for Sleep 30 tablet 0    latanoprost (XALATAN) 0.005 % ophthalmic solution Place 1 drop into both eyes nightly      Calcium Carb-Cholecalciferol (CALCIUM 600 + D PO) Take by mouth      Multiple Vitamins-Minerals (CENTRUM PO) Take  by mouth. No current facility-administered medications for this visit. Patient's past medical history, surgical history, family history, medications,  and allergies  were all reviewed and updated as appropriate today. Review of Systems   Constitutional: Negative for fever. Endocrine: Negative for polydipsia, polyphagia and polyuria. Musculoskeletal: Negative for gait problem. Psychiatric/Behavioral: Negative for sleep disturbance. Physical Exam   Constitutional: She is oriented to person, place, and time. She appears well-developed and well-nourished. HENT:   Head: Normocephalic and atraumatic. Eyes: Conjunctivae and EOM are normal. No scleral icterus. Cardiovascular: Intact distal pulses. L varicose vein visible L medial groin to L ant knee   Pulmonary/Chest: Effort normal.   Musculoskeletal: Normal range of motion. She exhibits no edema. Neurological: She is alert and oriented to person, place, and time. Skin: Skin is warm and dry. Psychiatric: She has a normal mood and affect. Her behavior is normal. Judgment and thought content normal.         /60 (Site: Left Upper Arm, Position: Sitting)   Pulse 76   Wt 143 lb 3.2 oz (65 kg)   SpO2 99%   Breastfeeding? No   BMI 24.58 kg/m²     Assessment/Plan: [facility] was seen today for other. Diagnoses and all orders for this visit:    Varicose vein of leg   Assurance given    Need for influenza vaccination  -     INFLUENZA, HIGH DOSE, 65 YRS +, IM, PF, PREFILL SYR, 0.5ML (FLUZONE HD)    Controlled type 2 diabetes mellitus without complication, without long-term current use of insulin (HCC)  -     pioglitazone (ACTOS) 15 MG tablet; Take 1 tablet by mouth daily. Rx if free to pt, and she will cont until her next a1c with her [place] pcp or until 1/18 when out of donut hole.

## 2018-09-27 ENCOUNTER — HOSPITAL ENCOUNTER (OUTPATIENT)
Dept: PHYSICAL THERAPY | Age: 83
Setting detail: THERAPIES SERIES
Discharge: HOME | End: 2018-09-27
Payer: MEDICARE

## 2018-09-27 PROCEDURE — 97150 GROUP THERAPEUTIC PROCEDURES: CPT

## 2018-09-27 PROCEDURE — 97113 AQUATIC THERAPY/EXERCISES: CPT

## 2018-09-27 NOTE — FLOWSHEET NOTE
care [] Alter current plan (see comments)    [] Plan of care initiated [] Hold pending MD visit [x] Discharge    See Weekly Progress Note: [] Yes  [x] No  Next due:

## 2018-10-17 ENCOUNTER — TELEPHONE (OUTPATIENT)
Dept: OTHER | Age: 83
End: 2018-10-17

## 2018-10-29 ENCOUNTER — OFFICE VISIT (OUTPATIENT)
Dept: FAMILY MEDICINE CLINIC | Age: 83
End: 2018-10-29
Payer: MEDICARE

## 2018-10-29 VITALS
HEART RATE: 75 BPM | RESPIRATION RATE: 16 BRPM | SYSTOLIC BLOOD PRESSURE: 130 MMHG | BODY MASS INDEX: 24.24 KG/M2 | TEMPERATURE: 98.7 F | OXYGEN SATURATION: 98 % | DIASTOLIC BLOOD PRESSURE: 58 MMHG | HEIGHT: 64 IN | WEIGHT: 142 LBS

## 2018-10-29 DIAGNOSIS — J40 BRONCHITIS: Primary | ICD-10-CM

## 2018-10-29 PROCEDURE — 1101F PT FALLS ASSESS-DOCD LE1/YR: CPT | Performed by: FAMILY MEDICINE

## 2018-10-29 PROCEDURE — G8482 FLU IMMUNIZE ORDER/ADMIN: HCPCS | Performed by: FAMILY MEDICINE

## 2018-10-29 PROCEDURE — 4040F PNEUMOC VAC/ADMIN/RCVD: CPT | Performed by: FAMILY MEDICINE

## 2018-10-29 PROCEDURE — G8420 CALC BMI NORM PARAMETERS: HCPCS | Performed by: FAMILY MEDICINE

## 2018-10-29 PROCEDURE — 1123F ACP DISCUSS/DSCN MKR DOCD: CPT | Performed by: FAMILY MEDICINE

## 2018-10-29 PROCEDURE — 99213 OFFICE O/P EST LOW 20 MIN: CPT | Performed by: FAMILY MEDICINE

## 2018-10-29 PROCEDURE — G8427 DOCREV CUR MEDS BY ELIG CLIN: HCPCS | Performed by: FAMILY MEDICINE

## 2018-10-29 PROCEDURE — 1090F PRES/ABSN URINE INCON ASSESS: CPT | Performed by: FAMILY MEDICINE

## 2018-10-29 PROCEDURE — 1036F TOBACCO NON-USER: CPT | Performed by: FAMILY MEDICINE

## 2018-10-29 RX ORDER — AZITHROMYCIN 250 MG/1
TABLET, FILM COATED ORAL
Qty: 1 PACKET | Refills: 0 | Status: SHIPPED | OUTPATIENT
Start: 2018-10-29 | End: 2018-11-02

## 2018-10-29 ASSESSMENT — ENCOUNTER SYMPTOMS
CHEST TIGHTNESS: 1
SHORTNESS OF BREATH: 0
SINUS PAIN: 0
RHINORRHEA: 0
SORE THROAT: 0
WHEEZING: 0
COUGH: 1
SINUS PRESSURE: 0

## 2018-12-13 ENCOUNTER — TELEPHONE (OUTPATIENT)
Dept: FAMILY MEDICINE CLINIC | Age: 83
End: 2018-12-13

## 2019-03-26 ENCOUNTER — PREPPED CHART (OUTPATIENT)
Dept: URBAN - METROPOLITAN AREA CLINIC 32 | Facility: CLINIC | Age: 84
End: 2019-03-26

## 2019-06-05 RX ORDER — METFORMIN HYDROCHLORIDE 500 MG/1
500 TABLET, EXTENDED RELEASE ORAL
Qty: 90 TABLET | Refills: 1 | Status: SHIPPED | OUTPATIENT
Start: 2019-06-05 | End: 2019-10-03 | Stop reason: SDUPTHER

## 2019-07-17 ENCOUNTER — NURSE ONLY (OUTPATIENT)
Dept: FAMILY MEDICINE CLINIC | Age: 84
End: 2019-07-17
Payer: MEDICARE

## 2019-07-17 DIAGNOSIS — E87.5 HYPERKALEMIA: Primary | ICD-10-CM

## 2019-07-17 DIAGNOSIS — E11.9 TYPE 2 DIABETES MELLITUS WITHOUT COMPLICATION, WITHOUT LONG-TERM CURRENT USE OF INSULIN (HCC): ICD-10-CM

## 2019-07-17 DIAGNOSIS — E78.2 MIXED HYPERLIPIDEMIA: ICD-10-CM

## 2019-07-17 LAB
A/G RATIO: 2 (ref 1.1–2.2)
ALBUMIN SERPL-MCNC: 4.5 G/DL (ref 3.4–5)
ALP BLD-CCNC: 74 U/L (ref 40–129)
ALT SERPL-CCNC: 21 U/L (ref 10–40)
ANION GAP SERPL CALCULATED.3IONS-SCNC: 15 MMOL/L (ref 3–16)
AST SERPL-CCNC: 14 U/L (ref 15–37)
BILIRUB SERPL-MCNC: 0.3 MG/DL (ref 0–1)
BUN BLDV-MCNC: 24 MG/DL (ref 7–20)
CALCIUM SERPL-MCNC: 9.5 MG/DL (ref 8.3–10.6)
CHLORIDE BLD-SCNC: 98 MMOL/L (ref 99–110)
CHOLESTEROL, TOTAL: 192 MG/DL (ref 0–199)
CO2: 23 MMOL/L (ref 21–32)
CREAT SERPL-MCNC: 0.8 MG/DL (ref 0.6–1.2)
GFR AFRICAN AMERICAN: >60
GFR NON-AFRICAN AMERICAN: >60
GLOBULIN: 2.2 G/DL
GLUCOSE BLD-MCNC: 273 MG/DL (ref 70–99)
HDLC SERPL-MCNC: 78 MG/DL (ref 40–60)
LDL CHOLESTEROL CALCULATED: 95 MG/DL
POTASSIUM SERPL-SCNC: 5.5 MMOL/L (ref 3.5–5.1)
SODIUM BLD-SCNC: 136 MMOL/L (ref 136–145)
TOTAL PROTEIN: 6.7 G/DL (ref 6.4–8.2)
TRIGL SERPL-MCNC: 96 MG/DL (ref 0–150)
VLDLC SERPL CALC-MCNC: 19 MG/DL

## 2019-07-17 PROCEDURE — 36415 COLL VENOUS BLD VENIPUNCTURE: CPT | Performed by: FAMILY MEDICINE

## 2019-07-18 LAB
ESTIMATED AVERAGE GLUCOSE: 208.7 MG/DL
HBA1C MFR BLD: 8.9 %

## 2019-07-22 ENCOUNTER — OFFICE VISIT (OUTPATIENT)
Dept: FAMILY MEDICINE CLINIC | Age: 84
End: 2019-07-22
Payer: MEDICARE

## 2019-07-22 VITALS
OXYGEN SATURATION: 99 % | WEIGHT: 141 LBS | BODY MASS INDEX: 24.07 KG/M2 | DIASTOLIC BLOOD PRESSURE: 60 MMHG | SYSTOLIC BLOOD PRESSURE: 136 MMHG | HEART RATE: 78 BPM | HEIGHT: 64 IN | RESPIRATION RATE: 16 BRPM

## 2019-07-22 DIAGNOSIS — E87.5 HYPERKALEMIA: ICD-10-CM

## 2019-07-22 DIAGNOSIS — I10 BENIGN ESSENTIAL HTN: ICD-10-CM

## 2019-07-22 DIAGNOSIS — G89.29 CHRONIC PAIN OF RIGHT KNEE: ICD-10-CM

## 2019-07-22 DIAGNOSIS — F51.01 PRIMARY INSOMNIA: ICD-10-CM

## 2019-07-22 DIAGNOSIS — E11.9 TYPE 2 DIABETES MELLITUS WITHOUT COMPLICATION, WITHOUT LONG-TERM CURRENT USE OF INSULIN (HCC): Primary | ICD-10-CM

## 2019-07-22 DIAGNOSIS — M25.561 CHRONIC PAIN OF RIGHT KNEE: ICD-10-CM

## 2019-07-22 DIAGNOSIS — R35.1 NOCTURIA: ICD-10-CM

## 2019-07-22 DIAGNOSIS — F41.9 ANXIETY: ICD-10-CM

## 2019-07-22 PROCEDURE — G8420 CALC BMI NORM PARAMETERS: HCPCS | Performed by: FAMILY MEDICINE

## 2019-07-22 PROCEDURE — 1036F TOBACCO NON-USER: CPT | Performed by: FAMILY MEDICINE

## 2019-07-22 PROCEDURE — 36415 COLL VENOUS BLD VENIPUNCTURE: CPT | Performed by: FAMILY MEDICINE

## 2019-07-22 PROCEDURE — 99214 OFFICE O/P EST MOD 30 MIN: CPT | Performed by: FAMILY MEDICINE

## 2019-07-22 PROCEDURE — G8427 DOCREV CUR MEDS BY ELIG CLIN: HCPCS | Performed by: FAMILY MEDICINE

## 2019-07-22 PROCEDURE — 1123F ACP DISCUSS/DSCN MKR DOCD: CPT | Performed by: FAMILY MEDICINE

## 2019-07-22 PROCEDURE — 4040F PNEUMOC VAC/ADMIN/RCVD: CPT | Performed by: FAMILY MEDICINE

## 2019-07-22 PROCEDURE — 1090F PRES/ABSN URINE INCON ASSESS: CPT | Performed by: FAMILY MEDICINE

## 2019-07-22 RX ORDER — PIOGLITAZONEHYDROCHLORIDE 15 MG/1
15 TABLET ORAL DAILY
Qty: 90 TABLET | Refills: 3 | Status: SHIPPED | OUTPATIENT
Start: 2019-07-22 | End: 2020-08-31 | Stop reason: SDUPTHER

## 2019-07-22 RX ORDER — ALPRAZOLAM 0.25 MG/1
0.25 TABLET ORAL DAILY PRN
Qty: 30 TABLET | Refills: 0 | Status: SHIPPED | OUTPATIENT
Start: 2019-07-22 | End: 2019-08-21

## 2019-07-22 RX ORDER — ZOLPIDEM TARTRATE 5 MG/1
5 TABLET ORAL NIGHTLY PRN
Qty: 30 TABLET | Refills: 0 | Status: SHIPPED | OUTPATIENT
Start: 2019-07-22 | End: 2020-08-31 | Stop reason: SDUPTHER

## 2019-07-22 ASSESSMENT — PATIENT HEALTH QUESTIONNAIRE - PHQ9
SUM OF ALL RESPONSES TO PHQ9 QUESTIONS 1 & 2: 0
1. LITTLE INTEREST OR PLEASURE IN DOING THINGS: 0
SUM OF ALL RESPONSES TO PHQ QUESTIONS 1-9: 0
2. FEELING DOWN, DEPRESSED OR HOPELESS: 0
SUM OF ALL RESPONSES TO PHQ QUESTIONS 1-9: 0

## 2019-07-22 ASSESSMENT — ENCOUNTER SYMPTOMS
ABDOMINAL PAIN: 0
SHORTNESS OF BREATH: 0
CHEST TIGHTNESS: 0

## 2019-07-22 NOTE — PROGRESS NOTES
Multiple Vitamins-Minerals (CENTRUM PO) Take  by mouth. No current facility-administered medications for this visit. Patient's past medical history,surgical history, family history, medications,  and allergies  were all reviewed and updated as appropriate today. Review of Systems   Constitutional: Negative for activity change, appetite change, fatigue and unexpected weight change. Eyes: Negative for visual disturbance. Respiratory: Negative for chest tightness and shortness of breath. Cardiovascular: Negative for chest pain, palpitations and leg swelling. Gastrointestinal: Negative for abdominal pain. Endocrine: Negative for polydipsia, polyphagia and polyuria. Genitourinary: Negative for dysuria. Skin: Negative for wound. Neurological: Negative for dizziness, light-headedness and numbness. Psychiatric/Behavioral: Negative for dysphoric mood. The patient is not nervous/anxious. Physical Exam   Constitutional: She is oriented to person, place, and time. She appears well-developed and well-nourished. HENT:   Head: Normocephalic and atraumatic. Right Ear: External ear normal.   Left Ear: External ear normal.   Mouth/Throat: Oropharynx is clear and moist. No oropharyngeal exudate. Nl R and L ear canal and TM   Eyes: Pupils are equal, round, and reactive to light. Conjunctivae and EOM are normal. No scleral icterus. Neck: Normal range of motion. Neck supple. Thyromegaly present. Cardiovascular: Normal rate, regular rhythm, normal heart sounds and intact distal pulses. Exam reveals no gallop and no friction rub. No murmur heard. Pulmonary/Chest: Effort normal and breath sounds normal. She has no rales. She exhibits no tenderness. Abdominal: Soft. Bowel sounds are normal. She exhibits no distension and no mass. There is no tenderness. There is no rebound and no guarding. Musculoskeletal: Normal range of motion. She exhibits no edema.    Lymphadenopathy:     She

## 2019-07-23 ENCOUNTER — TELEPHONE (OUTPATIENT)
Dept: FAMILY MEDICINE CLINIC | Age: 84
End: 2019-07-23

## 2019-07-23 DIAGNOSIS — E11.9 TYPE 2 DIABETES MELLITUS WITHOUT COMPLICATION, WITHOUT LONG-TERM CURRENT USE OF INSULIN (HCC): Primary | ICD-10-CM

## 2019-07-23 LAB — POTASSIUM SERPL-SCNC: 4.9 MMOL/L (ref 3.5–5.1)

## 2019-07-24 NOTE — TELEPHONE ENCOUNTER
Pls tell her that the only lab I'll need is an A1C, and we can do that by fingerstick and get the results during her appt. No need for her to come in prior to appt for labs.

## 2019-07-25 ENCOUNTER — TELEPHONE (OUTPATIENT)
Dept: FAMILY MEDICINE CLINIC | Age: 84
End: 2019-07-25

## 2019-07-25 DIAGNOSIS — R35.1 NOCTURIA: Primary | ICD-10-CM

## 2019-07-25 LAB
ORGANISM: ABNORMAL
URINE CULTURE, ROUTINE: ABNORMAL
URINE CULTURE, ROUTINE: ABNORMAL

## 2019-07-25 RX ORDER — CIPROFLOXACIN 500 MG/1
500 TABLET, FILM COATED ORAL 2 TIMES DAILY
Qty: 10 TABLET | Refills: 0 | Status: SHIPPED | OUTPATIENT
Start: 2019-07-25 | End: 2019-07-30

## 2019-07-31 ENCOUNTER — TELEPHONE (OUTPATIENT)
Dept: FAMILY MEDICINE CLINIC | Age: 84
End: 2019-07-31

## 2019-10-02 ENCOUNTER — TELEPHONE (OUTPATIENT)
Dept: FAMILY MEDICINE CLINIC | Age: 84
End: 2019-10-02

## 2019-10-02 ENCOUNTER — OFFICE VISIT (OUTPATIENT)
Dept: FAMILY MEDICINE CLINIC | Age: 84
End: 2019-10-02
Payer: MEDICARE

## 2019-10-02 DIAGNOSIS — Z23 NEED FOR INFLUENZA VACCINATION: ICD-10-CM

## 2019-10-02 DIAGNOSIS — I87.2 VENOUS STASIS DERMATITIS OF LEFT LOWER EXTREMITY: ICD-10-CM

## 2019-10-02 DIAGNOSIS — M25.472 LEFT ANKLE SWELLING: ICD-10-CM

## 2019-10-02 DIAGNOSIS — M79.89 LEG SWELLING: Primary | ICD-10-CM

## 2019-10-02 DIAGNOSIS — E87.5 HYPERKALEMIA: ICD-10-CM

## 2019-10-02 LAB
ANION GAP SERPL CALCULATED.3IONS-SCNC: 13 MMOL/L (ref 3–16)
BUN BLDV-MCNC: 13 MG/DL (ref 7–20)
CALCIUM SERPL-MCNC: 9.4 MG/DL (ref 8.3–10.6)
CHLORIDE BLD-SCNC: 98 MMOL/L (ref 99–110)
CO2: 28 MMOL/L (ref 21–32)
CREAT SERPL-MCNC: 0.7 MG/DL (ref 0.6–1.2)
GFR AFRICAN AMERICAN: >60
GFR NON-AFRICAN AMERICAN: >60
GLUCOSE BLD-MCNC: 150 MG/DL (ref 70–99)
POTASSIUM SERPL-SCNC: 5.3 MMOL/L (ref 3.5–5.1)
SODIUM BLD-SCNC: 139 MMOL/L (ref 136–145)

## 2019-10-02 PROCEDURE — 1036F TOBACCO NON-USER: CPT | Performed by: FAMILY MEDICINE

## 2019-10-02 PROCEDURE — 99214 OFFICE O/P EST MOD 30 MIN: CPT | Performed by: FAMILY MEDICINE

## 2019-10-02 PROCEDURE — 1090F PRES/ABSN URINE INCON ASSESS: CPT | Performed by: FAMILY MEDICINE

## 2019-10-02 PROCEDURE — G0008 ADMIN INFLUENZA VIRUS VAC: HCPCS | Performed by: FAMILY MEDICINE

## 2019-10-02 PROCEDURE — G8427 DOCREV CUR MEDS BY ELIG CLIN: HCPCS | Performed by: FAMILY MEDICINE

## 2019-10-02 PROCEDURE — 1123F ACP DISCUSS/DSCN MKR DOCD: CPT | Performed by: FAMILY MEDICINE

## 2019-10-02 PROCEDURE — 36415 COLL VENOUS BLD VENIPUNCTURE: CPT | Performed by: FAMILY MEDICINE

## 2019-10-02 PROCEDURE — G8482 FLU IMMUNIZE ORDER/ADMIN: HCPCS | Performed by: FAMILY MEDICINE

## 2019-10-02 PROCEDURE — 90653 IIV ADJUVANT VACCINE IM: CPT | Performed by: FAMILY MEDICINE

## 2019-10-02 PROCEDURE — G8419 CALC BMI OUT NRM PARAM NOF/U: HCPCS | Performed by: FAMILY MEDICINE

## 2019-10-02 PROCEDURE — 4040F PNEUMOC VAC/ADMIN/RCVD: CPT | Performed by: FAMILY MEDICINE

## 2019-10-02 ASSESSMENT — ENCOUNTER SYMPTOMS
ABDOMINAL PAIN: 0
SHORTNESS OF BREATH: 0
CHEST TIGHTNESS: 0

## 2019-10-03 ENCOUNTER — HOSPITAL ENCOUNTER (OUTPATIENT)
Dept: VASCULAR LAB | Age: 84
Discharge: HOME | End: 2019-10-03
Payer: MEDICARE

## 2019-10-03 ENCOUNTER — HOSPITAL ENCOUNTER (OUTPATIENT)
Age: 84
Discharge: HOME | End: 2019-10-03
Payer: MEDICARE

## 2019-10-03 ENCOUNTER — HOSPITAL ENCOUNTER (OUTPATIENT)
Dept: GENERAL RADIOLOGY | Age: 84
Discharge: HOME | End: 2019-10-03
Payer: MEDICARE

## 2019-10-03 DIAGNOSIS — M25.472 LEFT ANKLE SWELLING: ICD-10-CM

## 2019-10-03 DIAGNOSIS — M79.89 LEG SWELLING: ICD-10-CM

## 2019-10-03 LAB
ESTIMATED AVERAGE GLUCOSE: 226 MG/DL
HBA1C MFR BLD: 9.5 %

## 2019-10-03 PROCEDURE — 73600 X-RAY EXAM OF ANKLE: CPT

## 2019-10-03 PROCEDURE — 93971 EXTREMITY STUDY: CPT

## 2019-10-03 RX ORDER — METFORMIN HYDROCHLORIDE 500 MG/1
1000 TABLET, EXTENDED RELEASE ORAL
Qty: 180 TABLET | Refills: 3 | Status: SHIPPED | OUTPATIENT
Start: 2019-10-03 | End: 2020-08-31

## 2019-10-08 VITALS
HEART RATE: 80 BPM | DIASTOLIC BLOOD PRESSURE: 58 MMHG | WEIGHT: 147.8 LBS | RESPIRATION RATE: 16 BRPM | BODY MASS INDEX: 25.23 KG/M2 | HEIGHT: 64 IN | SYSTOLIC BLOOD PRESSURE: 128 MMHG

## 2019-11-21 ENCOUNTER — ESTABLISHED PATIENT (OUTPATIENT)
Dept: URBAN - METROPOLITAN AREA CLINIC 32 | Facility: CLINIC | Age: 84
End: 2019-11-21

## 2019-11-21 DIAGNOSIS — H40.1130: ICD-10-CM

## 2019-11-21 PROCEDURE — G8785 BP SCRN NO PERF AT INTERVAL: HCPCS

## 2019-11-21 PROCEDURE — 3284F IOP RED >=15% PRE-NTRV LVL: CPT

## 2019-11-21 PROCEDURE — 92083 EXTENDED VISUAL FIELD XM: CPT

## 2019-11-21 PROCEDURE — 1036F TOBACCO NON-USER: CPT

## 2019-11-21 PROCEDURE — 3285F IOP DOWN <15% OF PRE-SVC LVL: CPT

## 2019-11-21 PROCEDURE — G9903 PT SCRN TBCO ID AS NON USER: HCPCS

## 2019-11-21 PROCEDURE — 2027F OPTIC NERVE HEAD EVAL DONE: CPT

## 2019-11-21 PROCEDURE — 0517F GLAUCOMA PLAN OF CARE DOCD: CPT

## 2019-11-21 PROCEDURE — G8482 FLU IMMUNIZE ORDER/ADMIN: HCPCS

## 2019-11-21 PROCEDURE — G8427 DOCREV CUR MEDS BY ELIG CLIN: HCPCS

## 2019-11-21 PROCEDURE — 92012 INTRM OPH EXAM EST PATIENT: CPT

## 2019-11-21 PROCEDURE — 4040F PNEUMOC VAC/ADMIN/RCVD: CPT

## 2019-11-21 RX ORDER — NETARSUDIL AND LATANOPROST OPHTHALMIC SOLUTION, 0.02%/0.005% .2; .05 MG/ML; MG/ML
1 SOLUTION/ DROPS OPHTHALMIC; TOPICAL EVERY EVENING
Start: 2019-11-21

## 2019-11-21 ASSESSMENT — TONOMETRY
OS_IOP_MMHG: 14
OD_IOP_MMHG: 15

## 2019-11-21 ASSESSMENT — VISUAL ACUITY
OD_SC: 20/30
OS_SC: 20/25-3

## 2019-11-26 ENCOUNTER — TELEPHONE (OUTPATIENT)
Dept: FAMILY MEDICINE CLINIC | Age: 84
End: 2019-11-26

## 2019-12-04 ENCOUNTER — TELEPHONE (OUTPATIENT)
Dept: FAMILY MEDICINE CLINIC | Age: 84
End: 2019-12-04

## 2019-12-10 ENCOUNTER — ESTABLISHED PATIENT (OUTPATIENT)
Dept: URBAN - METROPOLITAN AREA CLINIC 32 | Facility: CLINIC | Age: 84
End: 2019-12-10

## 2019-12-10 DIAGNOSIS — H40.1132: ICD-10-CM

## 2019-12-10 PROCEDURE — 2027F OPTIC NERVE HEAD EVAL DONE: CPT

## 2019-12-10 PROCEDURE — G9903 PT SCRN TBCO ID AS NON USER: HCPCS

## 2019-12-10 PROCEDURE — 92133 CPTRZD OPH DX IMG PST SGM ON: CPT

## 2019-12-10 PROCEDURE — 4040F PNEUMOC VAC/ADMIN/RCVD: CPT

## 2019-12-10 PROCEDURE — 3285F IOP DOWN <15% OF PRE-SVC LVL: CPT

## 2019-12-10 PROCEDURE — 3284F IOP RED >=15% PRE-NTRV LVL: CPT

## 2019-12-10 PROCEDURE — G8482 FLU IMMUNIZE ORDER/ADMIN: HCPCS

## 2019-12-10 PROCEDURE — G8785 BP SCRN NO PERF AT INTERVAL: HCPCS

## 2019-12-10 PROCEDURE — 99213 OFFICE O/P EST LOW 20 MIN: CPT

## 2019-12-10 PROCEDURE — G8428 CUR MEDS NOT DOCUMENT: HCPCS

## 2019-12-10 PROCEDURE — 0517F GLAUCOMA PLAN OF CARE DOCD: CPT

## 2019-12-10 PROCEDURE — 1036F TOBACCO NON-USER: CPT

## 2019-12-10 ASSESSMENT — TONOMETRY
OD_IOP_MMHG: 15
OS_IOP_MMHG: 15

## 2019-12-10 ASSESSMENT — VISUAL ACUITY
OS_SC: 20/25
OD_SC: 20/30

## 2020-03-17 ENCOUNTER — FOLLOW UP (OUTPATIENT)
Dept: URBAN - METROPOLITAN AREA CLINIC 32 | Facility: CLINIC | Age: 85
End: 2020-03-17

## 2020-03-17 DIAGNOSIS — H40.1132: ICD-10-CM

## 2020-03-17 PROCEDURE — 92012 INTRM OPH EXAM EST PATIENT: CPT

## 2020-03-17 ASSESSMENT — TONOMETRY
OD_IOP_MMHG: 15
OS_IOP_MMHG: 15

## 2020-03-17 ASSESSMENT — VISUAL ACUITY
OS_PH: 20/25
OS_SC: 20/40
OD_SC: 20/40+2
OD_PH: 20/25

## 2020-05-20 RX ORDER — SIMVASTATIN 20 MG
20 TABLET ORAL NIGHTLY
Qty: 90 TABLET | Refills: 3 | Status: SHIPPED | OUTPATIENT
Start: 2020-05-20

## 2020-07-21 ENCOUNTER — TELEPHONE (OUTPATIENT)
Dept: FAMILY MEDICINE CLINIC | Age: 85
End: 2020-07-21

## 2020-07-22 NOTE — TELEPHONE ENCOUNTER
Her last bs here was 150, which is fine, but her a1c was on the high side. That is why in Oct 2019 I increased her metformin from 1 to 2qd in addition to her actos/pioglidazone. Pls see if pt is willing to do a VV with me, and if if she is, I'll order labs prior to her VV. However, if she prefers an OV, ok with me and we'll do a poct a1c then.

## 2020-07-22 NOTE — TELEPHONE ENCOUNTER
Pt was EXTREMELY anxious because she found and old lab from [facility] showing a glucose value of 236. She was very worried because it had risen from her previous value of 216. She wanted to know what we showed her 'glucose level as and if she should have labs drawn since she is back from [facility]. I attempted to explain A1C results and calm patient, however she was still very nervous.  I told I would discuss with Dr. Bettina Morgan and call her back but in the mean time try to relax, watch sugar and carb in take- this did make her feel some what better

## 2020-08-11 ENCOUNTER — TELEPHONE (OUTPATIENT)
Dept: FAMILY MEDICINE CLINIC | Age: 85
End: 2020-08-11

## 2020-08-11 NOTE — TELEPHONE ENCOUNTER
----- Message from Azul Azul sent at 8/11/2020 12:38 PM EDT -----  Subject: Message to Provider    QUESTIONS  Information for Provider? PT WOULD LIKE LAB ORDERS CALLED IN BEFORE APPT   SO SHE CAN GET BLOOD TAKEN A WEEK BEFORE  ---------------------------------------------------------------------------  --------------  CALL BACK INFO  What is the best way for the office to contact you? OK to leave message on   voicemail  Preferred Call Back Phone Number? [phone]  ---------------------------------------------------------------------------  --------------  SCRIPT ANSWERS  Relationship to Patient?  Self

## 2020-08-25 DIAGNOSIS — I10 BENIGN ESSENTIAL HTN: ICD-10-CM

## 2020-08-25 DIAGNOSIS — E78.2 MIXED HYPERLIPIDEMIA: ICD-10-CM

## 2020-08-25 LAB
A/G RATIO: 1.7 (ref 1.1–2.2)
ALBUMIN SERPL-MCNC: 4.3 G/DL (ref 3.4–5)
ALP BLD-CCNC: 86 U/L (ref 40–129)
ALT SERPL-CCNC: 21 U/L (ref 10–40)
ANION GAP SERPL CALCULATED.3IONS-SCNC: 12 MMOL/L (ref 3–16)
AST SERPL-CCNC: 20 U/L (ref 15–37)
BILIRUB SERPL-MCNC: 0.6 MG/DL (ref 0–1)
BUN BLDV-MCNC: 14 MG/DL (ref 7–20)
CALCIUM SERPL-MCNC: 9.3 MG/DL (ref 8.3–10.6)
CHLORIDE BLD-SCNC: 98 MMOL/L (ref 99–110)
CHOLESTEROL, TOTAL: 213 MG/DL (ref 0–199)
CO2: 26 MMOL/L (ref 21–32)
CREAT SERPL-MCNC: 0.7 MG/DL (ref 0.6–1.2)
GFR AFRICAN AMERICAN: >60
GFR NON-AFRICAN AMERICAN: >60
GLOBULIN: 2.5 G/DL
GLUCOSE BLD-MCNC: 263 MG/DL (ref 70–99)
HCT VFR BLD CALC: 44.4 % (ref 36–48)
HDLC SERPL-MCNC: 61 MG/DL (ref 40–60)
HEMOGLOBIN: 14.4 G/DL (ref 12–16)
LDL CHOLESTEROL CALCULATED: 126 MG/DL
MCH RBC QN AUTO: 29.5 PG (ref 26–34)
MCHC RBC AUTO-ENTMCNC: 32.5 G/DL (ref 31–36)
MCV RBC AUTO: 90.6 FL (ref 80–100)
PDW BLD-RTO: 14.8 % (ref 12.4–15.4)
PLATELET # BLD: 187 K/UL (ref 135–450)
PMV BLD AUTO: 10.6 FL (ref 5–10.5)
POTASSIUM SERPL-SCNC: 5.1 MMOL/L (ref 3.5–5.1)
RBC # BLD: 4.89 M/UL (ref 4–5.2)
SODIUM BLD-SCNC: 136 MMOL/L (ref 136–145)
TOTAL PROTEIN: 6.8 G/DL (ref 6.4–8.2)
TRIGL SERPL-MCNC: 128 MG/DL (ref 0–150)
VLDLC SERPL CALC-MCNC: 26 MG/DL
WBC # BLD: 9.9 K/UL (ref 4–11)

## 2020-08-26 LAB
ESTIMATED AVERAGE GLUCOSE: 234.6 MG/DL
HBA1C MFR BLD: 9.8 %

## 2020-08-31 ENCOUNTER — OFFICE VISIT (OUTPATIENT)
Dept: FAMILY MEDICINE CLINIC | Age: 85
End: 2020-08-31
Payer: MEDICARE

## 2020-08-31 VITALS
DIASTOLIC BLOOD PRESSURE: 68 MMHG | OXYGEN SATURATION: 96 % | TEMPERATURE: 97.7 F | HEART RATE: 73 BPM | SYSTOLIC BLOOD PRESSURE: 132 MMHG | BODY MASS INDEX: 25.81 KG/M2 | WEIGHT: 148 LBS

## 2020-08-31 PROCEDURE — 1036F TOBACCO NON-USER: CPT | Performed by: FAMILY MEDICINE

## 2020-08-31 PROCEDURE — 4040F PNEUMOC VAC/ADMIN/RCVD: CPT | Performed by: FAMILY MEDICINE

## 2020-08-31 PROCEDURE — 1090F PRES/ABSN URINE INCON ASSESS: CPT | Performed by: FAMILY MEDICINE

## 2020-08-31 PROCEDURE — G8427 DOCREV CUR MEDS BY ELIG CLIN: HCPCS | Performed by: FAMILY MEDICINE

## 2020-08-31 PROCEDURE — 99214 OFFICE O/P EST MOD 30 MIN: CPT | Performed by: FAMILY MEDICINE

## 2020-08-31 PROCEDURE — G8417 CALC BMI ABV UP PARAM F/U: HCPCS | Performed by: FAMILY MEDICINE

## 2020-08-31 PROCEDURE — 1123F ACP DISCUSS/DSCN MKR DOCD: CPT | Performed by: FAMILY MEDICINE

## 2020-08-31 RX ORDER — PIOGLITAZONEHYDROCHLORIDE 15 MG/1
15 TABLET ORAL DAILY
Qty: 90 TABLET | Refills: 3 | Status: SHIPPED | OUTPATIENT
Start: 2020-08-31

## 2020-08-31 RX ORDER — ZOLPIDEM TARTRATE 5 MG/1
5 TABLET ORAL NIGHTLY PRN
Qty: 30 TABLET | Refills: 0 | Status: SHIPPED | OUTPATIENT
Start: 2020-08-31 | End: 2020-09-30

## 2020-08-31 ASSESSMENT — PATIENT HEALTH QUESTIONNAIRE - PHQ9
SUM OF ALL RESPONSES TO PHQ QUESTIONS 1-9: 0
SUM OF ALL RESPONSES TO PHQ9 QUESTIONS 1 & 2: 0
SUM OF ALL RESPONSES TO PHQ QUESTIONS 1-9: 0
1. LITTLE INTEREST OR PLEASURE IN DOING THINGS: 0
2. FEELING DOWN, DEPRESSED OR HOPELESS: 0

## 2020-08-31 NOTE — PROGRESS NOTES
jnuvPatient: Ubaldo Rich is a 80 y. o.female who presents today with the following Chief Complaint(s):  Chief Complaint   Patient presents with    Diabetes         HPI: Pt with DM2, hld, multinod goiter had labs 8/25/20. , increased from 95 7/2019. A1C as below. On 3/9/20, A1C by [facility] MD was 10.0. Pt understood him to d/c lisin 2.5 mg and pioglitazine. Pt changed metfor ER to 1 bid as 2 qam caused gi sx's. Was on januvia in past but cost was prohibitive. Pt requests ambien 5 mg rf, was rx'd 30 pills 7/2019. Takes 1/2 pill 2-3 times per month. Denies hang over, fuzzy thinking, confusion. Has hx of torn meniscus R knee, not painful but is annoyed by OA snapping in R knee. Remains active. Has growth under R axilla long term, not bothersome. Lab Results   Component Value Date    LABA1C 9.8 08/25/2020    LABA1C 9.5 10/02/2019    LABA1C 8.9 07/17/2019     :        Current Outpatient Medications   Medication Sig Dispense Refill    zolpidem (AMBIEN) 5 MG tablet Take 1 tablet by mouth nightly as needed for Sleep for up to 30 days. 30 tablet 0    pioglitazone (ACTOS) 15 MG tablet Take 1 tablet by mouth daily 90 tablet 3    simvastatin (ZOCOR) 20 MG tablet TAKE 1 TABLET BY MOUTH  NIGHTLY 90 tablet 3    metFORMIN (GLUCOPHAGE-XR) 500 MG extended release tablet Take 2 tablets by mouth daily (with breakfast) 180 tablet 3    lisinopril (PRINIVIL;ZESTRIL) 2.5 MG tablet TAKE 1 TABLET BY MOUTH  DAILY 90 tablet 1    latanoprost (XALATAN) 0.005 % ophthalmic solution Place 1 drop into both eyes nightly      Calcium Carb-Cholecalciferol (CALCIUM 600 + D PO) Take by mouth      Multiple Vitamins-Minerals (CENTRUM PO) Take  by mouth. No current facility-administered medications for this visit. Patient's past medical history,surgical history, family history, medications,  and allergies  were all reviewed and updated as appropriate today.     Review of Systems      Physical Exam  Constitutional: General: She is not in acute distress. Appearance: Normal appearance. She is well-developed. She is not ill-appearing. HENT:      Head: Normocephalic and atraumatic. Right Ear: Tympanic membrane, ear canal and external ear normal.      Left Ear: Tympanic membrane, ear canal and external ear normal.      Mouth/Throat:      Pharynx: Oropharynx is clear. No oropharyngeal exudate. Eyes:      General: No scleral icterus. Right eye: No discharge. Left eye: No discharge. Extraocular Movements: Extraocular movements intact. Conjunctiva/sclera: Conjunctivae normal.   Neck:      Musculoskeletal: Normal range of motion and neck supple. Vascular: No carotid bruit. Comments: Pos TMG, stable  Cardiovascular:      Rate and Rhythm: Normal rate and regular rhythm. Pulses: Normal pulses. Heart sounds: Normal heart sounds. No murmur. Pulmonary:      Effort: Pulmonary effort is normal. No respiratory distress. Breath sounds: Normal breath sounds. Abdominal:      General: Bowel sounds are normal. There is no distension. Palpations: Abdomen is soft. There is no mass. Tenderness: There is no abdominal tenderness. Musculoskeletal: Normal range of motion. Right lower leg: No edema. Left lower leg: No edema. Lymphadenopathy:      Cervical: No cervical adenopathy. Skin:     General: Skin is warm and dry. Capillary Refill: Capillary refill takes less than 2 seconds. Coloration: Skin is not jaundiced or pale. Findings: Rash (venous stasis medial distal lower legs) present. Comments: 1 cm sebaceous cyst R axilla   Neurological:      General: No focal deficit present. Mental Status: She is alert and oriented to person, place, and time. Cranial Nerves: No cranial nerve deficit. Deep Tendon Reflexes: Reflexes are normal and symmetric.    Psychiatric:         Mood and Affect: Mood normal.         Behavior: Behavior normal.         Thought Content: Thought content normal.         Judgment: Judgment normal.           /68   Pulse 73   Temp 97.7 °F (36.5 °C) (Temporal)   Wt 148 lb (67.1 kg)   SpO2 96%   BMI 25.81 kg/m²     Assessment/Plan: Rangel Moeller was seen today for diabetes. Diagnoses and all orders for this visit:    Uncontrolled diabetes mellitus type [facility] without complications [facility]  -     pioglitazone (ACTOS) 15 MG tablet; Take 1 tablet by mouth daily, pt agrees to resume. Primary insomnia  -     zolpidem (AMBIEN) 5 MG tablet; Take 1 tablet by mouth nightly as needed for Sleep for up to 30 days, rf, expected to last 1 yr.     Mixed hyperlipidemia   CPM    Sebaceous cyst of right axilla   Assurance given    Resume herrera

## 2020-09-18 ENCOUNTER — NURSE ONLY (OUTPATIENT)
Dept: FAMILY MEDICINE CLINIC | Age: 85
End: 2020-09-18
Payer: MEDICARE

## 2020-09-18 PROCEDURE — G0008 ADMIN INFLUENZA VIRUS VAC: HCPCS | Performed by: FAMILY MEDICINE

## 2020-09-18 PROCEDURE — 90694 VACC AIIV4 NO PRSRV 0.5ML IM: CPT | Performed by: FAMILY MEDICINE

## 2020-09-18 NOTE — PROGRESS NOTES
Vaccine Information Sheet, \"Influenza - Inactivated\"  given to Kayleen Bravo, or parent/legal guardian of  Kayleen Bravo and verbalized understanding. Patient responses:    Have you ever had a reaction to a flu vaccine? No  Do you have any current illness? No  Have you ever had [place] Syndrome? No  Do you have a serious allergy to any of the follow: Neomycin, Polymyxin, Thimerosal, eggs or egg products? No    Flu vaccine given per order. Please see immunization tab. Risks and benefits explained. Current VIS given.

## 2021-02-23 ENCOUNTER — ESTABLISHED COMPREHENSIVE EXAM (OUTPATIENT)
Dept: URBAN - METROPOLITAN AREA CLINIC 32 | Facility: CLINIC | Age: 86
End: 2021-02-23

## 2021-02-23 DIAGNOSIS — H40.1132: ICD-10-CM

## 2021-02-23 PROCEDURE — 92014 COMPRE OPH EXAM EST PT 1/>: CPT

## 2021-02-23 PROCEDURE — 92133 CPTRZD OPH DX IMG PST SGM ON: CPT

## 2021-02-23 ASSESSMENT — VISUAL ACUITY
OD_SC: 20/60+2
OS_CC: J1+
OD_CC: J1
OU_CC: J1+
OU_SC: 20/30-2
OS_PH: 20/30-1
OS_SC: 20/40+1
OD_PH: 20/50+2

## 2021-02-23 ASSESSMENT — TONOMETRY
OS_IOP_MMHG: 16
OD_IOP_MMHG: 16

## 2021-08-25 ENCOUNTER — VISUAL FIELD FOLLOW-UP (OUTPATIENT)
Dept: URBAN - METROPOLITAN AREA CLINIC 32 | Facility: CLINIC | Age: 86
End: 2021-08-25

## 2021-08-25 DIAGNOSIS — H40.1132: ICD-10-CM

## 2021-08-25 PROCEDURE — 99213 OFFICE O/P EST LOW 20 MIN: CPT

## 2021-08-25 PROCEDURE — 92083 EXTENDED VISUAL FIELD XM: CPT

## 2021-08-25 ASSESSMENT — VISUAL ACUITY
OD_SC: 20/60+1
OD_PH: 20/50
OS_SC: 20/40-2
OS_PH: 20/30-2

## 2021-08-25 ASSESSMENT — TONOMETRY
OD_IOP_MMHG: 16
OS_IOP_MMHG: 14

## 2022-02-24 ENCOUNTER — COMPREHENSIVE EXAM (OUTPATIENT)
Dept: URBAN - METROPOLITAN AREA CLINIC 32 | Facility: CLINIC | Age: 87
End: 2022-02-24

## 2022-02-24 DIAGNOSIS — H40.1132: ICD-10-CM

## 2022-02-24 PROCEDURE — 92133 CPTRZD OPH DX IMG PST SGM ON: CPT

## 2022-02-24 PROCEDURE — 99213 OFFICE O/P EST LOW 20 MIN: CPT

## 2022-02-24 ASSESSMENT — VISUAL ACUITY
OD_GLARE: 20/100
OS_GLARE: 20/80
OS_SC: 20/40
OD_PH: 20/40
OD_SC: 20/50

## 2022-02-24 ASSESSMENT — TONOMETRY
OS_IOP_MMHG: 13
OD_IOP_MMHG: 18

## 2022-08-31 ENCOUNTER — FOLLOW UP (OUTPATIENT)
Dept: URBAN - METROPOLITAN AREA CLINIC 32 | Facility: CLINIC | Age: 87
End: 2022-08-31

## 2022-08-31 DIAGNOSIS — H16.223: ICD-10-CM

## 2022-08-31 DIAGNOSIS — H40.1132: ICD-10-CM

## 2022-08-31 PROCEDURE — 92083 EXTENDED VISUAL FIELD XM: CPT

## 2022-08-31 PROCEDURE — 92012 INTRM OPH EXAM EST PATIENT: CPT

## 2022-08-31 ASSESSMENT — TONOMETRY
OS_IOP_MMHG: 18
OD_IOP_MMHG: 16

## 2022-08-31 ASSESSMENT — VISUAL ACUITY
OS_SC: 20/30-1
OD_SC: 20/40
OD_PH: 20/30-1

## 2023-02-28 NOTE — PATIENT DISCUSSION
BMI Within normal limits, continue current management. Spoke with pt.  Has been taking 1/2 tablet of digoxin 125 mcg and did not  the rx for 62.5 mcg as ordered 12/2022.  [facility] and [facility] 1 tablet daily.  Pt voices understanding and agrees with plan.

## 2023-03-17 NOTE — TELEPHONE ENCOUNTER
Patient calling wants to let Dr Grady Markham know she is back in town from [place] and wants to come in for an appointment (not sure if it's time) but wants blood work done before visit. Last blood work done on 3/5/19 in [place]. Also  Patient states the metformin she is taking is causing her diarrhea and wants to know if Dr Grady Markham can prescribe  Slower Release Metformin 500mg.   Please call patient at [facility] [phone] [facility] Spoke to Randi and she states that the nurses state that she is stable and that they sent her back to her room after administering fluids and that the patient seemed severely dehydrated. Randi is going to keep patient on the previus medication dosage and call back next week to report how she is doing. We will make a decision wether or not to continue with medication increase or keep her on current dose.

## 2023-05-12 ENCOUNTER — EMERGENCY VISIT (OUTPATIENT)
Dept: URBAN - METROPOLITAN AREA CLINIC 32 | Facility: CLINIC | Age: 88
End: 2023-05-12

## 2023-05-12 DIAGNOSIS — H16.223: ICD-10-CM

## 2023-05-12 DIAGNOSIS — H00.025: ICD-10-CM

## 2023-05-12 PROCEDURE — 92012 INTRM OPH EXAM EST PATIENT: CPT

## 2023-05-12 RX ORDER — AZITHROMYCIN MONOHYDRATE 10 MG/ML
1 SOLUTION/ DROPS OPHTHALMIC EVERY EVENING
Start: 2023-05-12 | End: 2023-05-19

## 2023-05-12 ASSESSMENT — KERATOMETRY
OD_AXISANGLE_DEGREES: 21
OD_AXISANGLE2_DEGREES: 111
OS_K2POWER_DIOPTERS: 44.75
OS_K1POWER_DIOPTERS: 46.00
OD_K2POWER_DIOPTERS: 44.50
OS_AXISANGLE2_DEGREES: 72
OD_K1POWER_DIOPTERS: 46.25
OS_AXISANGLE_DEGREES: 162

## 2023-05-12 ASSESSMENT — VISUAL ACUITY
OS_SC: 20/60-1
OD_SC: 20/40-1

## 2023-05-12 ASSESSMENT — TONOMETRY
OS_IOP_MMHG: 19
OD_IOP_MMHG: 19

## 2023-10-26 ENCOUNTER — CONTACT LENSES/GLASSES VISIT (OUTPATIENT)
Dept: URBAN - METROPOLITAN AREA CLINIC 32 | Facility: CLINIC | Age: 88
End: 2023-10-26

## 2023-10-26 DIAGNOSIS — Z96.1: ICD-10-CM

## 2023-10-26 PROCEDURE — 92015 DETERMINE REFRACTIVE STATE: CPT

## 2023-10-26 ASSESSMENT — KERATOMETRY
OD_AXISANGLE2_DEGREES: 111
OD_K2POWER_DIOPTERS: 44.50
OS_K1POWER_DIOPTERS: 46.00
OS_AXISANGLE2_DEGREES: 72
OD_K1POWER_DIOPTERS: 46.25
OD_AXISANGLE_DEGREES: 21
OS_K2POWER_DIOPTERS: 44.75
OS_AXISANGLE_DEGREES: 162

## 2023-10-26 ASSESSMENT — VISUAL ACUITY
OD_SC: 20/40
OS_SC: 20/60
OS_SC: 20/25
OD_SC: 20/25

## 2023-11-07 ENCOUNTER — FOLLOW UP (OUTPATIENT)
Dept: URBAN - METROPOLITAN AREA CLINIC 32 | Facility: CLINIC | Age: 88
End: 2023-11-07

## 2023-11-07 DIAGNOSIS — H35.363: ICD-10-CM

## 2023-11-07 DIAGNOSIS — E11.9: ICD-10-CM

## 2023-11-07 DIAGNOSIS — H16.223: ICD-10-CM

## 2023-11-07 DIAGNOSIS — H40.1132: ICD-10-CM

## 2023-11-07 PROCEDURE — 92250 FUNDUS PHOTOGRAPHY W/I&R: CPT

## 2023-11-07 PROCEDURE — 99213 OFFICE O/P EST LOW 20 MIN: CPT

## 2023-11-07 ASSESSMENT — TONOMETRY
OS_IOP_MMHG: 18
OD_IOP_MMHG: 20
OS_IOP_MMHG: 15
OD_IOP_MMHG: 16

## 2023-11-07 ASSESSMENT — KERATOMETRY
OD_K2POWER_DIOPTERS: 44.50
OS_K2POWER_DIOPTERS: 44.75
OD_AXISANGLE2_DEGREES: 111
OS_AXISANGLE_DEGREES: 162
OD_K1POWER_DIOPTERS: 46.25
OD_AXISANGLE_DEGREES: 21
OS_AXISANGLE2_DEGREES: 72
OS_K1POWER_DIOPTERS: 46.00

## 2023-11-07 ASSESSMENT — VISUAL ACUITY
OS_SC: 20/70-2
OD_SC: 20/50+2
OS_PH: 20/50+2

## 2024-04-24 NOTE — PATIENT DISCUSSION
PACH -1/-1. Plan: Healing very well, photos taken. Detail Level: Detailed Detail Level: Simple Plan: Appears to be hypertrophic scar from  previous mole removal.  He cannot remember specifics except he was told it was okay.  Site does not itch. Plan: No sites are lichinified, so improved from last visit.  Continue to try not to pick, and apply Vaseline ointment a couple times daily to any healing wounds to encourage healing.